# Patient Record
Sex: MALE | Race: OTHER | NOT HISPANIC OR LATINO | Employment: FULL TIME | URBAN - METROPOLITAN AREA
[De-identification: names, ages, dates, MRNs, and addresses within clinical notes are randomized per-mention and may not be internally consistent; named-entity substitution may affect disease eponyms.]

---

## 2023-11-05 ENCOUNTER — HOSPITAL ENCOUNTER (EMERGENCY)
Facility: HOSPITAL | Age: 38
Discharge: HOME | End: 2023-11-06
Attending: EMERGENCY MEDICINE
Payer: COMMERCIAL

## 2023-11-05 VITALS
TEMPERATURE: 99 F | HEART RATE: 103 BPM | DIASTOLIC BLOOD PRESSURE: 74 MMHG | SYSTOLIC BLOOD PRESSURE: 143 MMHG | OXYGEN SATURATION: 98 % | RESPIRATION RATE: 20 BRPM

## 2023-11-05 DIAGNOSIS — M25.562 LEFT KNEE PAIN: ICD-10-CM

## 2023-11-05 DIAGNOSIS — S86.812A RUPTURE OF LEFT PATELLAR TENDON, INITIAL ENCOUNTER: Primary | ICD-10-CM

## 2023-11-05 PROCEDURE — 99284 EMERGENCY DEPT VISIT MOD MDM: CPT | Mod: 25

## 2023-11-05 PROCEDURE — 29505 APPLICATION LONG LEG SPLINT: CPT | Mod: LT

## 2023-11-05 RX ORDER — OXYCODONE AND ACETAMINOPHEN 5; 325 MG/1; MG/1
1 TABLET ORAL EVERY 6 HOURS PRN
Qty: 12 TABLET | Refills: 0 | Status: ON HOLD | OUTPATIENT
Start: 2023-11-05 | End: 2023-11-09 | Stop reason: HOSPADM

## 2023-11-05 RX ORDER — HYDROCODONE BITARTRATE AND ACETAMINOPHEN 10; 325 MG/1; MG/1
1 TABLET ORAL
Status: COMPLETED | OUTPATIENT
Start: 2023-11-06 | End: 2023-11-05

## 2023-11-05 RX ADMIN — HYDROCODONE BITARTRATE AND ACETAMINOPHEN 1 TABLET: 10; 325 TABLET ORAL at 11:11

## 2023-11-05 NOTE — Clinical Note
"Tano Poole" Kris was seen and treated in our emergency department on 11/5/2023.  He may return to work on 11/09/2023.       If you have any questions or concerns, please don't hesitate to call.      Pierre Loco NP"

## 2023-11-06 ENCOUNTER — TELEPHONE (OUTPATIENT)
Dept: ORTHOPEDICS | Facility: CLINIC | Age: 38
End: 2023-11-06
Payer: COMMERCIAL

## 2023-11-06 PROCEDURE — 25000003 PHARM REV CODE 250: Performed by: NURSE PRACTITIONER

## 2023-11-06 NOTE — TELEPHONE ENCOUNTER
----- Message from Yasmin Dale sent at 11/6/2023  8:19 AM CST -----  Contact: Tano Carmona is calling in regards to getting an appt due to rupture patella. Pt was seen at ER on 11/5. Please call back at [phone] if no answer call [phone]                            Thanks  KT

## 2023-11-06 NOTE — ED PROVIDER NOTES
Encounter Date: 11/5/2023       History     Chief Complaint   Patient presents with    Knee Injury     Pt reports left knee dislocation and unable to bear weight, was playing soccer when it occurred     Patient is a 38-year-old male who presents with left knee pain.  Patient reports injuring his knee while playing soccer today.  States that he twisted 1 way and felt like his knee cap dislocated.  Patient is unable to bear weight to the left leg.  Patient is unable to extend his left leg.  No medications taken prior to arrival for relief of symptoms.  Patient shows no signs of distress at this time.      Review of patient's allergies indicates:  No Known Allergies  History reviewed. No pertinent past medical history.  No past surgical history on file.  No family history on file.     Review of Systems   Constitutional:  Negative for fever.   HENT:  Negative for sore throat.    Respiratory:  Negative for shortness of breath.    Cardiovascular:  Negative for chest pain.   Gastrointestinal:  Negative for nausea.   Genitourinary:  Negative for dysuria.   Musculoskeletal:  Positive for arthralgias (left knee) and joint swelling (left knee). Negative for back pain.   Skin:  Negative for rash.   Neurological:  Negative for weakness.   Hematological:  Does not bruise/bleed easily.       Physical Exam     Initial Vitals [11/05/23 2025]   BP Pulse Resp Temp SpO2   (!) 143/74 103 18 98.6 °F (37 °C) 98 %      MAP       --         Physical Exam    Nursing note and vitals reviewed.  Constitutional: He appears well-developed and well-nourished.   HENT:   Head: Normocephalic and atraumatic.   Eyes: Conjunctivae and EOM are normal. Pupils are equal, round, and reactive to light.   Neck: Neck supple.   Normal range of motion.  Cardiovascular:  Normal rate, regular rhythm, normal heart sounds and intact distal pulses.           Pulmonary/Chest: Breath sounds normal.   Abdominal: Abdomen is soft. Bowel sounds are normal.    Musculoskeletal:      Cervical back: Normal range of motion and neck supple.      Left knee: Swelling, deformity and bony tenderness present. Decreased range of motion (unable to extend). Tenderness present over the patellar tendon. Abnormal patellar mobility.     Neurological: He is alert and oriented to person, place, and time. He has normal strength and normal reflexes.   Skin: Skin is warm and dry. Capillary refill takes less than 2 seconds.   Psychiatric: He has a normal mood and affect. His behavior is normal. Judgment and thought content normal.         ED Course   Procedures  Labs Reviewed - No data to display       Imaging Results              MRI Knee Without Contrast Left (In process)                       X-Ray Knee Complete 4 or More Views Left (Final result)  Result time 11/05/23 21:17:59      Final result by Shahram Orta MD (11/05/23 21:17:59)                   Impression:      As above.    This report was flagged in Epic as abnormal.      Electronically signed by: Shahram Orta  Date:    11/05/2023  Time:    21:17               Narrative:    EXAMINATION:  XR KNEE COMP 4 OR MORE VIEWS LEFT    CLINICAL HISTORY:  Pain in left knee    TECHNIQUE:  AP, Lateral, Sunrise and Tunnel views of the left knee were preformed    COMPARISON:  None    FINDINGS:  No fracture.  No osseous destructive process.  Possible patella Judy.  Correlation for injury mechanism to exclude patellar tendon injury.                                       Medications   HYDROcodone-acetaminophen  mg per tablet 1 tablet (1 tablet Oral Given 11/5/23 1690)     Medical Decision Making  Patient informed of imaging results.  Patient instructed to stay nonweightbearing on the leg and use crutches as instructed.  Patient to follow-up with orthopedist further evaluation and management.  Patient verbalized understanding agrees to.    Amount and/or Complexity of Data Reviewed  Radiology: ordered.  Discussion of management or test  interpretation with external provider(s): Case discussed with Dr. Liao with ortho.  She recommends patient receive MRI in the ER to rule out tendon rupture.    11:50 PM  Dr. Liao veiwed MRI, states patient ruptured patellar tendon and will need surgery.  Pt able to be discharged home in knee immobilizer, crutches and non weight bearing.  Clinic will call patient for surgery this week.    Risk  Prescription drug management.                               Clinical Impression:   Final diagnoses:  [M25.562] Left knee pain  [S86.812A] Rupture of left patellar tendon, initial encounter (Primary)        ED Disposition Condition    Discharge Stable          ED Prescriptions       Medication Sig Dispense Start Date End Date Auth. Provider    oxyCODONE-acetaminophen (PERCOCET) 5-325 mg per tablet Take 1 tablet by mouth every 6 (six) hours as needed for Pain. 12 tablet 11/5/2023 -- Pierre Loco NP          Follow-up Information       Follow up With Specialties Details Why Contact Info    Clinic, [facility]  Call   [address] [place] [address] [address]  [place] [id number]  [phone]               Pierre Loco NP  11/05/23 7493

## 2023-11-07 ENCOUNTER — HOSPITAL ENCOUNTER (OUTPATIENT)
Dept: CARDIOLOGY | Facility: HOSPITAL | Age: 38
Discharge: HOME | End: 2023-11-07
Attending: ORTHOPAEDIC SURGERY
Payer: COMMERCIAL

## 2023-11-07 ENCOUNTER — OFFICE VISIT (OUTPATIENT)
Dept: ORTHOPEDICS | Facility: CLINIC | Age: 38
End: 2023-11-07
Payer: COMMERCIAL

## 2023-11-07 ENCOUNTER — TELEPHONE (OUTPATIENT)
Dept: PREADMISSION TESTING | Facility: HOSPITAL | Age: 38
End: 2023-11-07
Payer: COMMERCIAL

## 2023-11-07 ENCOUNTER — HOSPITAL ENCOUNTER (OUTPATIENT)
Dept: RADIOLOGY | Facility: HOSPITAL | Age: 38
Discharge: HOME | End: 2023-11-07
Attending: ORTHOPAEDIC SURGERY
Payer: COMMERCIAL

## 2023-11-07 VITALS
WEIGHT: 250 LBS | BODY MASS INDEX: 37.03 KG/M2 | HEART RATE: 87 BPM | HEIGHT: 69 IN | DIASTOLIC BLOOD PRESSURE: 68 MMHG | SYSTOLIC BLOOD PRESSURE: 127 MMHG

## 2023-11-07 DIAGNOSIS — M25.572 ACUTE LEFT ANKLE PAIN: ICD-10-CM

## 2023-11-07 DIAGNOSIS — S93.422A SPRAIN OF DELTOID LIGAMENT OF LEFT ANKLE, INITIAL ENCOUNTER: ICD-10-CM

## 2023-11-07 DIAGNOSIS — M25.572 ACUTE LEFT ANKLE PAIN: Primary | ICD-10-CM

## 2023-11-07 DIAGNOSIS — S86.812D RUPTURE OF LEFT PATELLAR TENDON, SUBSEQUENT ENCOUNTER: Primary | ICD-10-CM

## 2023-11-07 DIAGNOSIS — S86.812D RUPTURE OF LEFT PATELLAR TENDON, SUBSEQUENT ENCOUNTER: ICD-10-CM

## 2023-11-07 DIAGNOSIS — M19.072 ARTHRITIS OF LEFT ANKLE: ICD-10-CM

## 2023-11-07 PROBLEM — S86.812A RUPTURE OF LEFT PATELLAR TENDON: Status: ACTIVE | Noted: 2023-11-07

## 2023-11-07 PROCEDURE — 93005 ELECTROCARDIOGRAM TRACING: CPT

## 2023-11-07 PROCEDURE — 1159F MED LIST DOCD IN RCRD: CPT | Mod: CPTII,S$GLB,, | Performed by: ORTHOPAEDIC SURGERY

## 2023-11-07 PROCEDURE — 1160F RVW MEDS BY RX/DR IN RCRD: CPT | Mod: CPTII,S$GLB,, | Performed by: ORTHOPAEDIC SURGERY

## 2023-11-07 PROCEDURE — 3078F PR MOST RECENT DIASTOLIC BLOOD PRESSURE < 80 MM HG: ICD-10-PCS | Mod: CPTII,S$GLB,, | Performed by: ORTHOPAEDIC SURGERY

## 2023-11-07 PROCEDURE — 93010 EKG 12-LEAD: ICD-10-PCS | Mod: ,,, | Performed by: INTERNAL MEDICINE

## 2023-11-07 PROCEDURE — 73610 X-RAY EXAM OF ANKLE: CPT | Mod: 26,LT,, | Performed by: RADIOLOGY

## 2023-11-07 PROCEDURE — 99204 PR OFFICE/OUTPT VISIT, NEW, LEVL IV, 45-59 MIN: ICD-10-PCS | Mod: S$GLB,,, | Performed by: ORTHOPAEDIC SURGERY

## 2023-11-07 PROCEDURE — 3008F BODY MASS INDEX DOCD: CPT | Mod: CPTII,S$GLB,, | Performed by: ORTHOPAEDIC SURGERY

## 2023-11-07 PROCEDURE — 1159F PR MEDICATION LIST DOCUMENTED IN MEDICAL RECORD: ICD-10-PCS | Mod: CPTII,S$GLB,, | Performed by: ORTHOPAEDIC SURGERY

## 2023-11-07 PROCEDURE — 3078F DIAST BP <80 MM HG: CPT | Mod: CPTII,S$GLB,, | Performed by: ORTHOPAEDIC SURGERY

## 2023-11-07 PROCEDURE — 3074F SYST BP LT 130 MM HG: CPT | Mod: CPTII,S$GLB,, | Performed by: ORTHOPAEDIC SURGERY

## 2023-11-07 PROCEDURE — 1160F PR REVIEW ALL MEDS BY PRESCRIBER/CLIN PHARMACIST DOCUMENTED: ICD-10-PCS | Mod: CPTII,S$GLB,, | Performed by: ORTHOPAEDIC SURGERY

## 2023-11-07 PROCEDURE — 93010 ELECTROCARDIOGRAM REPORT: CPT | Mod: ,,, | Performed by: INTERNAL MEDICINE

## 2023-11-07 PROCEDURE — 99204 OFFICE O/P NEW MOD 45 MIN: CPT | Mod: S$GLB,,, | Performed by: ORTHOPAEDIC SURGERY

## 2023-11-07 PROCEDURE — 73610 XR ANKLE COMPLETE 3 VIEW LEFT: ICD-10-PCS | Mod: 26,LT,, | Performed by: RADIOLOGY

## 2023-11-07 PROCEDURE — 99999 PR PBB SHADOW E&M-EST. PATIENT-LVL V: ICD-10-PCS | Mod: PBBFAC,,, | Performed by: ORTHOPAEDIC SURGERY

## 2023-11-07 PROCEDURE — 3074F PR MOST RECENT SYSTOLIC BLOOD PRESSURE < 130 MM HG: ICD-10-PCS | Mod: CPTII,S$GLB,, | Performed by: ORTHOPAEDIC SURGERY

## 2023-11-07 PROCEDURE — 3008F PR BODY MASS INDEX (BMI) DOCUMENTED: ICD-10-PCS | Mod: CPTII,S$GLB,, | Performed by: ORTHOPAEDIC SURGERY

## 2023-11-07 PROCEDURE — 99999 PR PBB SHADOW E&M-EST. PATIENT-LVL V: CPT | Mod: PBBFAC,,, | Performed by: ORTHOPAEDIC SURGERY

## 2023-11-07 PROCEDURE — 73610 X-RAY EXAM OF ANKLE: CPT | Mod: TC,LT

## 2023-11-07 NOTE — H&P (VIEW-ONLY)
"Subjective:     Patient ID: Tano Ponce is a 38 y.o. male.    Chief Complaint: Pain and Injury of the Left Knee      HPI:  The patient is in with his wife due to a left d knee injury of November 5, 2023.  Playing soccer and running.  The left knee popped and he fell.  Was seen in the emergency room.  Patellar tendon rupture was diagnosed.  He is had x-rayse.  And an MRI.  Now for orthopedic follow-up.    Complains of swelling and pain at the left knee.  Unable to extend the knee.  Complains of left ankle pain And swelling.  Has a history of left knee swelling in the past.    Works at a chemical plant.    History reviewed. No pertinent past medical history.  History reviewed. No pertinent surgical history.  History reviewed. No pertinent family history.  Social History     Socioeconomic History    Marital status:    Tobacco Use    Smoking status: Former     Types: Cigarettes    Smokeless tobacco: Current     Medication List with Changes/Refills   Current Medications    OXYCODONE-ACETAMINOPHEN (PERCOCET) 5-325 MG PER TABLET    Take 1 tablet by mouth every 6 (six) hours as needed for Pain.     Review of patient's allergies indicates:  No Known Allergies  ROS     Objective:   Body mass index is 36.92 kg/m².  Vitals:    11/07/23 1011   BP: 127/68   Pulse: 87   Weight: 113.4 kg (250 lb)   Height: 5' 9" (1.753 m)   PainSc: 10-Worst pain ever   PainLoc: Knee       PHYSICAL EXAM:  Well-developed overweight male in no acutelo distress.  He comes in with crutches and a knee immobilizer partial weight-bearing on the left lower extremity.    Examination of the left knee reveals 3+ effusion.  There is tenderness at the patellar tendon.  Unable to actively extend the knee.  No calf swelling or tenderness.  Moves the ankle and foot normally.    Examination of the left ankle reveals a small joint effusionc.  There is tenderness around the medial malleolus.  Good range of motion.  No instability    X-rays of the left " knee from November 5, 2023 reveal high-riding patella    MRI of the left knee from November 5, 2023 reveals rupture of the patellar tendon from the patella.    X-rays of the left ankle done today reveal degenerative arthritis.  Joint space is maintained except at the medial clear space where this narrowed with the presence of osteophytes and calcification.    Rupture of left patellar tendon, subsequent encounter  -     Case Request Operating Room: REPAIR, TENDON, PATELLAR  -     CBC Auto Differential; Future; Expected date: 11/07/2023  -     Comprehensive Metabolic Panel; Future; Expected date: 11/07/2023  -     SCHEDULED EKG 12-LEAD (to Muse); Future    Arthritis of left ankle    Sprain of deltoid ligament of left ankle, initial encounter        Plan:  The patient has left patellar tendon Rupture.  Discussed this with him at length.  Plan to proceed with surgical repair on November 9. he has some arthritic changes of the left ankle with acute swelling likely related to spraining injury.  I discussed the nature of the surgical procedure to repair the patellar tendon, rehab and recovery and postop immobilization.  He understands and agrees that we proceed.  Pending CBC, CMP and EKG.    Patient Instructions   The left patellar tendon is ruptured and needs surgical repair.  This is scheduled for 7:00 a.m. on November 9.  You will be called the day prior with the time to be at the hospital for surgery.  Nothing to eat or drink after midnight on November 8.    The left ankle has arthritic changes and has been Sprained.  Expect this will gradually improve.    Postoperatively keep the knee brace on, keep bandage clean and dry, use crutches.  May put as much weight on the left leg as is comfortable.  Keep it elevated  As possible.  Return for postop appointment asVs will be scheduled               Geovani Carl MD, FAAOS [facility], Orthopedic Trauma Service  [place]

## 2023-11-07 NOTE — PATIENT INSTRUCTIONS
The left patellar tendon is ruptured and needs surgical repair.  This is scheduled for 7:00 a.m. on November 9.  You will be called the day prior with the time to be at the hospital for surgery.  Nothing to eat or drink after midnight on November 8.    The left ankle has arthritic changes and has been Sprained.  Expect this will gradually improve.    Postoperatively keep the knee brace on, keep bandage clean and dry, use crutches.  May put as much weight on the left leg as is comfortable.  Keep it elevated  As possible.  Return for postop appointment asVs will be scheduled

## 2023-11-07 NOTE — TELEPHONE ENCOUNTER
Pre op instructions reviewed with pt over telephone, verbalized understanding.    To confirm, Surgery is scheduled on 11/09/23. We will call you late afternoon the business day prior to surgery with your arrival time.    *Please report to the [facility] Lobby (1st Floor) located off of [facility] (2nd Entrance/Building on the left, in front of the flag pole).  Address: [address] [place] [address] [place]. [address]      INSTRUCTIONS IMPORTANT!!!  DO NOT Eat, Drink, or Smoke after 12 midnight unless instructed otherwise by your Surgeon. OK to brush teeth, no gum, candy or mints!    >>>MEDICATION INSTRUCTIONS<<<: Morning of Surgery, take small sip of water with ONLY these medications:  None    *Diabetic Patients: If you take diabetic or weight loss medication, Do NOT take morning of surgery unless instructed by Doctor. Metformin to be stopped 24 hrs prior to surgery. Ozempic/ Mounjaro/ Wegovy/ Trulicity/ Semaglutide injections or weight loss medication to be stopped 7 days prior to surgery. DO NOT take long-acting insulin the evening before surgery. Blood sugars will be checked in pre-op by Nurse.    *Patients should HOLD all vitamins, herbal supplements, weight loss medication, aspirin products & NSAIDS 7 days prior to surgery, as these can thin the blood. Ok to take Tylenol.    ____  Avoid Alcoholic beverages 3 days prior to surgery, as it can thin the blood.  ____  NO Acrylic/fake nails or nail polish worn day of surgery (specifically hand/arm & foot surgeries).  ____  NO powder, lotions, deodorants, oils or cream on body.  ____  Remove all jewelry & piercings & foreign objects before arrival & leave at home.  ____  Remove Dentures, Hearing Aids & Contact Lens prior to surgery.  ____  Bring photo ID and insurance information to hospital (Leave Valuables at Home).  ____  If going home the same day, arrange for a ride home. You will not be able to drive for 24 hrs if Anesthesia was used.   ____  Females (ages  11-60): may need to give a urine sample the morning of surgery; please see Pre op Nurse prior to using the restroom.  ____  Males: Stop ED medications (Viagra, Cialis) 24 hrs prior to surgery.  ____  Wear clean, loose fitting clothing to allow for dressings/ bandages.      Bathing Instructions:    -Shower with anti-bacterial Soap (Hibiclens or Dial) the night before surgery and the morning of.   -Do not use Hibiclens on your face or genitals.   -Apply clean clothes after shower.  -Do not shave your face or body 2 days prior to surgery unless instructed otherwise by your Surgeon.  -Do not shave pubic hair 7 days prior to surgery (gyn pt's).    [facility]/Ride Policy:  Only 2 adults allowed in pre op/recovery area during your procedure. You MUST HAVE A RIDE HOME from a responsible adult that you know and trust. Medical Transport, Uber or Lyft can ONLY be used if patient has a responsible adult to accompany them during ride home.       *Signs and symptoms of Infection Before or After Surgery:               !!!If you experience any fever, chills, nausea/ vomiting, foul odor/ excessive drainage from surgical site, flu-like symptoms, new wounds or cuts, PLEASE CALL THE SURGEON OFFICE at [phone] or SEND MESSAGE THROUGH [employer] PORTAL!!!       *If you are running late or have questions the morning of surgery, please call the [facility] Surgery Dept @ [phone].     *Billing questions:  [phone]       Thank you,  -[facility] Pre Admit Dept.  [phone] or [phone]  M-F 7:30 am-4:00 pm (Closed Major Holidays)

## 2023-11-07 NOTE — PROGRESS NOTES
"Subjective:     Patient ID: Tano Ponce is a 38 y.o. male.    Chief Complaint: Pain and Injury of the Left Knee      HPI:  The patient is in with his wife due to a left d knee injury of November 5, 2023.  Playing soccer and running.  The left knee popped and he fell.  Was seen in the emergency room.  Patellar tendon rupture was diagnosed.  He is had x-rayse.  And an MRI.  Now for orthopedic follow-up.    Complains of swelling and pain at the left knee.  Unable to extend the knee.  Complains of left ankle pain And swelling.  Has a history of left knee swelling in the past.    Works at a chemical plant.    History reviewed. No pertinent past medical history.  History reviewed. No pertinent surgical history.  History reviewed. No pertinent family history.  Social History     Socioeconomic History    Marital status:    Tobacco Use    Smoking status: Former     Types: Cigarettes    Smokeless tobacco: Current     Medication List with Changes/Refills   Current Medications    OXYCODONE-ACETAMINOPHEN (PERCOCET) 5-325 MG PER TABLET    Take 1 tablet by mouth every 6 (six) hours as needed for Pain.     Review of patient's allergies indicates:  No Known Allergies  ROS     Objective:   Body mass index is 36.92 kg/m².  Vitals:    11/07/23 1011   BP: 127/68   Pulse: 87   Weight: 113.4 kg (250 lb)   Height: 5' 9" (1.753 m)   PainSc: 10-Worst pain ever   PainLoc: Knee       PHYSICAL EXAM:  Well-developed overweight male in no acutelo distress.  He comes in with crutches and a knee immobilizer partial weight-bearing on the left lower extremity.    Examination of the left knee reveals 3+ effusion.  There is tenderness at the patellar tendon.  Unable to actively extend the knee.  No calf swelling or tenderness.  Moves the ankle and foot normally.    Examination of the left ankle reveals a small joint effusionc.  There is tenderness around the medial malleolus.  Good range of motion.  No instability    X-rays of the left " knee from November 5, 2023 reveal high-riding patella    MRI of the left knee from November 5, 2023 reveals rupture of the patellar tendon from the patella.    X-rays of the left ankle done today reveal degenerative arthritis.  Joint space is maintained except at the medial clear space where this narrowed with the presence of osteophytes and calcification.    Rupture of left patellar tendon, subsequent encounter  -     Case Request Operating Room: REPAIR, TENDON, PATELLAR  -     CBC Auto Differential; Future; Expected date: 11/07/2023  -     Comprehensive Metabolic Panel; Future; Expected date: 11/07/2023  -     SCHEDULED EKG 12-LEAD (to Muse); Future    Arthritis of left ankle    Sprain of deltoid ligament of left ankle, initial encounter        Plan:  The patient has left patellar tendon Rupture.  Discussed this with him at length.  Plan to proceed with surgical repair on November 9. he has some arthritic changes of the left ankle with acute swelling likely related to spraining injury.  I discussed the nature of the surgical procedure to repair the patellar tendon, rehab and recovery and postop immobilization.  He understands and agrees that we proceed.  Pending CBC, CMP and EKG.    Patient Instructions   The left patellar tendon is ruptured and needs surgical repair.  This is scheduled for 7:00 a.m. on November 9.  You will be called the day prior with the time to be at the hospital for surgery.  Nothing to eat or drink after midnight on November 8.    The left ankle has arthritic changes and has been Sprained.  Expect this will gradually improve.    Postoperatively keep the knee brace on, keep bandage clean and dry, use crutches.  May put as much weight on the left leg as is comfortable.  Keep it elevated  As possible.  Return for postop appointment asVs will be scheduled               Geovani Carl MD, FAAOS [facility], Orthopedic Trauma Service  [place]

## 2023-11-08 ENCOUNTER — TELEPHONE (OUTPATIENT)
Dept: PREADMISSION TESTING | Facility: HOSPITAL | Age: 38
End: 2023-11-08
Payer: COMMERCIAL

## 2023-11-08 ENCOUNTER — ANESTHESIA EVENT (OUTPATIENT)
Dept: SURGERY | Facility: HOSPITAL | Age: 38
End: 2023-11-08
Payer: COMMERCIAL

## 2023-11-08 NOTE — ANESTHESIA PREPROCEDURE EVALUATION
11/08/2023  Tano Ponce is a 38 y.o., male.    Patient Active Problem List   Diagnosis    Rupture of left patellar tendon    Arthritis of left ankle    Sprain of deltoid ligament of left ankle     Past Surgical History:   Procedure Laterality Date    COLONOSCOPY         Pre-op Assessment    I have reviewed the Patient Summary Reports.     I have reviewed the Nursing Notes. I have reviewed the NPO Status.   I have reviewed the Medications.     Review of Systems  Anesthesia Hx:  No problems with previous Anesthesia  Denies Family Hx of Anesthesia complications.   Denies Personal Hx of Anesthesia complications.   Social:  Alcohol Use, Former Smoker    Hematology/Oncology:  Hematology Normal        Cardiovascular:  Cardiovascular Normal  ECG has been reviewed.    Pulmonary:  Pulmonary Normal    Renal/:  Renal/ Normal     Hepatic/GI:   Liver Disease, (elevated LFT's)    Musculoskeletal:   Arthritis of left ankle  Rupture of left patellar tendon  Sprain of deltoid ligament of left ankle     Neurological:  Neurology Normal    Endocrine:  Endocrine Normal  Obesity / BMI > 30      Physical Exam  General: Alert and Oriented    Airway:  Mallampati: II   Mouth Opening: Normal  TM Distance: Normal  Tongue: Normal  Neck ROM: Normal ROM        Anesthesia Plan  Type of Anesthesia, risks & benefits discussed:    Anesthesia Type: Gen ETT  Intra-op Monitoring Plan: Standard ASA Monitors  Post Op Pain Control Plan: multimodal analgesia  Induction:  IV  Airway Plan: , Post-Induction  Informed Consent: Informed consent signed with the Patient and all parties understand the risks and agree with anesthesia plan.  All questions answered.   ASA Score: 2  Day of Surgery Review of History & Physical: I have interviewed and examined the patient. I have reviewed the patient's H&P dated:     Ready For Surgery From  Anesthesia Perspective.     .      Chemistry        Component Value Date/Time     11/07/2023 1230    K 4.2 11/07/2023 1230     11/07/2023 1230    CO2 25 11/07/2023 1230    BUN 13 11/07/2023 1230    CREATININE 1.1 11/07/2023 1230    GLU 84 11/07/2023 1230        Component Value Date/Time    CALCIUM 9.2 11/07/2023 1230    ALKPHOS 54 (L) 11/07/2023 1230    AST 58 (H) 11/07/2023 1230     (H) 11/07/2023 1230    BILITOT 1.4 (H) 11/07/2023 1230        Lab Results   Component Value Date    WBC 7.26 11/07/2023    HGB 14.0 11/07/2023    HCT 42.2 11/07/2023    MCV 86 11/07/2023     11/07/2023

## 2023-11-09 ENCOUNTER — ANESTHESIA (OUTPATIENT)
Dept: SURGERY | Facility: HOSPITAL | Age: 38
End: 2023-11-09
Payer: COMMERCIAL

## 2023-11-09 ENCOUNTER — HOSPITAL ENCOUNTER (OUTPATIENT)
Facility: HOSPITAL | Age: 38
Discharge: HOME | End: 2023-11-09
Attending: ORTHOPAEDIC SURGERY | Admitting: ORTHOPAEDIC SURGERY
Payer: COMMERCIAL

## 2023-11-09 DIAGNOSIS — S86.812D PATELLAR TENDON RUPTURE, LEFT, SUBSEQUENT ENCOUNTER: Primary | ICD-10-CM

## 2023-11-09 PROCEDURE — 63600175 PHARM REV CODE 636 W HCPCS: Performed by: ANESTHESIOLOGY

## 2023-11-09 PROCEDURE — 25000003 PHARM REV CODE 250: Performed by: ANESTHESIOLOGY

## 2023-11-09 PROCEDURE — 27380 REPAIR OF KNEECAP TENDON: CPT | Mod: LT,,, | Performed by: ORTHOPAEDIC SURGERY

## 2023-11-09 PROCEDURE — 63600175 PHARM REV CODE 636 W HCPCS: Performed by: NURSE ANESTHETIST, CERTIFIED REGISTERED

## 2023-11-09 PROCEDURE — 27380 PR FIX INFRAPATELLA TENDON,PRIMARY: ICD-10-PCS | Mod: LT,,, | Performed by: ORTHOPAEDIC SURGERY

## 2023-11-09 PROCEDURE — C1713 ANCHOR/SCREW BN/BN,TIS/BN: HCPCS | Performed by: ORTHOPAEDIC SURGERY

## 2023-11-09 PROCEDURE — 37000008 HC ANESTHESIA 1ST 15 MINUTES: Performed by: ORTHOPAEDIC SURGERY

## 2023-11-09 PROCEDURE — 71000039 HC RECOVERY, EACH ADD'L HOUR: Performed by: ORTHOPAEDIC SURGERY

## 2023-11-09 PROCEDURE — 37000009 HC ANESTHESIA EA ADD 15 MINS: Performed by: ORTHOPAEDIC SURGERY

## 2023-11-09 PROCEDURE — 71000015 HC POSTOP RECOV 1ST HR: Performed by: ORTHOPAEDIC SURGERY

## 2023-11-09 PROCEDURE — 36000711: Performed by: ORTHOPAEDIC SURGERY

## 2023-11-09 PROCEDURE — 27380 PR FIX INFRAPATELLA TENDON,PRIMARY: ICD-10-PCS | Mod: AS,LT,, | Performed by: PHYSICIAN ASSISTANT

## 2023-11-09 PROCEDURE — 25000003 PHARM REV CODE 250: Performed by: NURSE ANESTHETIST, CERTIFIED REGISTERED

## 2023-11-09 PROCEDURE — 27380 REPAIR OF KNEECAP TENDON: CPT | Mod: AS,LT,, | Performed by: PHYSICIAN ASSISTANT

## 2023-11-09 PROCEDURE — 36000710: Performed by: ORTHOPAEDIC SURGERY

## 2023-11-09 PROCEDURE — 71000033 HC RECOVERY, INTIAL HOUR: Performed by: ORTHOPAEDIC SURGERY

## 2023-11-09 DEVICE — 5.5MM BC CORKSCREW FT W/ SUTURETAPE
Type: IMPLANTABLE DEVICE | Site: KNEE | Status: FUNCTIONAL
Brand: ARTHREX®

## 2023-11-09 RX ORDER — DEXAMETHASONE SODIUM PHOSPHATE 4 MG/ML
INJECTION, SOLUTION INTRA-ARTICULAR; INTRALESIONAL; INTRAMUSCULAR; INTRAVENOUS; SOFT TISSUE
Status: DISCONTINUED | OUTPATIENT
Start: 2023-11-09 | End: 2023-11-09

## 2023-11-09 RX ORDER — KETOROLAC TROMETHAMINE 30 MG/ML
15 INJECTION, SOLUTION INTRAMUSCULAR; INTRAVENOUS EVERY 8 HOURS PRN
Status: DISCONTINUED | OUTPATIENT
Start: 2023-11-09 | End: 2023-11-09 | Stop reason: HOSPADM

## 2023-11-09 RX ORDER — ONDANSETRON 2 MG/ML
4 INJECTION INTRAMUSCULAR; INTRAVENOUS DAILY PRN
Status: DISCONTINUED | OUTPATIENT
Start: 2023-11-09 | End: 2023-11-09 | Stop reason: HOSPADM

## 2023-11-09 RX ORDER — ONDANSETRON 2 MG/ML
INJECTION INTRAMUSCULAR; INTRAVENOUS
Status: DISCONTINUED | OUTPATIENT
Start: 2023-11-09 | End: 2023-11-09

## 2023-11-09 RX ORDER — ALBUTEROL SULFATE 0.83 MG/ML
2.5 SOLUTION RESPIRATORY (INHALATION) EVERY 4 HOURS PRN
Status: DISCONTINUED | OUTPATIENT
Start: 2023-11-09 | End: 2023-11-09 | Stop reason: HOSPADM

## 2023-11-09 RX ORDER — FENTANYL CITRATE 50 UG/ML
INJECTION, SOLUTION INTRAMUSCULAR; INTRAVENOUS
Status: DISCONTINUED | OUTPATIENT
Start: 2023-11-09 | End: 2023-11-09

## 2023-11-09 RX ORDER — LIDOCAINE HYDROCHLORIDE 20 MG/ML
INJECTION INTRAVENOUS
Status: DISCONTINUED | OUTPATIENT
Start: 2023-11-09 | End: 2023-11-09

## 2023-11-09 RX ORDER — DIPHENHYDRAMINE HYDROCHLORIDE 50 MG/ML
25 INJECTION INTRAMUSCULAR; INTRAVENOUS EVERY 6 HOURS PRN
Status: DISCONTINUED | OUTPATIENT
Start: 2023-11-09 | End: 2023-11-09 | Stop reason: HOSPADM

## 2023-11-09 RX ORDER — SUCCINYLCHOLINE CHLORIDE 20 MG/ML
INJECTION INTRAMUSCULAR; INTRAVENOUS
Status: DISCONTINUED | OUTPATIENT
Start: 2023-11-09 | End: 2023-11-09

## 2023-11-09 RX ORDER — MIDAZOLAM HYDROCHLORIDE 1 MG/ML
INJECTION, SOLUTION INTRAMUSCULAR; INTRAVENOUS
Status: DISCONTINUED | OUTPATIENT
Start: 2023-11-09 | End: 2023-11-09

## 2023-11-09 RX ORDER — PROPOFOL 10 MG/ML
VIAL (ML) INTRAVENOUS
Status: DISCONTINUED | OUTPATIENT
Start: 2023-11-09 | End: 2023-11-09

## 2023-11-09 RX ORDER — ROCURONIUM BROMIDE 10 MG/ML
INJECTION, SOLUTION INTRAVENOUS
Status: DISCONTINUED | OUTPATIENT
Start: 2023-11-09 | End: 2023-11-09

## 2023-11-09 RX ORDER — MEPERIDINE HYDROCHLORIDE 25 MG/ML
12.5 INJECTION INTRAMUSCULAR; INTRAVENOUS; SUBCUTANEOUS ONCE
Status: DISCONTINUED | OUTPATIENT
Start: 2023-11-09 | End: 2023-11-09 | Stop reason: HOSPADM

## 2023-11-09 RX ORDER — CEFAZOLIN SODIUM 1 G/3ML
INJECTION, POWDER, FOR SOLUTION INTRAMUSCULAR; INTRAVENOUS
Status: DISCONTINUED | OUTPATIENT
Start: 2023-11-09 | End: 2023-11-09

## 2023-11-09 RX ORDER — PROCHLORPERAZINE EDISYLATE 5 MG/ML
5 INJECTION INTRAMUSCULAR; INTRAVENOUS EVERY 30 MIN PRN
Status: DISCONTINUED | OUTPATIENT
Start: 2023-11-09 | End: 2023-11-09 | Stop reason: HOSPADM

## 2023-11-09 RX ORDER — OXYCODONE HYDROCHLORIDE 5 MG/1
5 TABLET ORAL
Status: DISCONTINUED | OUTPATIENT
Start: 2023-11-09 | End: 2023-11-09 | Stop reason: HOSPADM

## 2023-11-09 RX ORDER — HYDROMORPHONE HYDROCHLORIDE 2 MG/ML
0.2 INJECTION, SOLUTION INTRAMUSCULAR; INTRAVENOUS; SUBCUTANEOUS EVERY 5 MIN PRN
Status: DISCONTINUED | OUTPATIENT
Start: 2023-11-09 | End: 2023-11-09 | Stop reason: HOSPADM

## 2023-11-09 RX ORDER — OXYCODONE AND ACETAMINOPHEN 5; 325 MG/1; MG/1
1 TABLET ORAL EVERY 6 HOURS PRN
Qty: 28 TABLET | Refills: 0 | Status: SHIPPED | OUTPATIENT
Start: 2023-11-09 | End: 2023-11-16

## 2023-11-09 RX ORDER — SODIUM CHLORIDE 0.9 % (FLUSH) 0.9 %
3 SYRINGE (ML) INJECTION
Status: DISCONTINUED | OUTPATIENT
Start: 2023-11-09 | End: 2023-11-09 | Stop reason: HOSPADM

## 2023-11-09 RX ADMIN — PROPOFOL 200 MG: 10 INJECTION, EMULSION INTRAVENOUS at 07:11

## 2023-11-09 RX ADMIN — LIDOCAINE HYDROCHLORIDE 50 MG: 20 INJECTION INTRAVENOUS at 07:11

## 2023-11-09 RX ADMIN — FENTANYL CITRATE 50 MCG: 50 INJECTION, SOLUTION INTRAMUSCULAR; INTRAVENOUS at 07:11

## 2023-11-09 RX ADMIN — OXYCODONE HYDROCHLORIDE 5 MG: 5 TABLET ORAL at 10:11

## 2023-11-09 RX ADMIN — HYDROMORPHONE HYDROCHLORIDE 0.2 MG: 2 INJECTION INTRAMUSCULAR; INTRAVENOUS; SUBCUTANEOUS at 09:11

## 2023-11-09 RX ADMIN — FENTANYL CITRATE 50 MCG: 50 INJECTION, SOLUTION INTRAMUSCULAR; INTRAVENOUS at 08:11

## 2023-11-09 RX ADMIN — HYDROMORPHONE HYDROCHLORIDE 0.2 MG: 2 INJECTION INTRAMUSCULAR; INTRAVENOUS; SUBCUTANEOUS at 10:11

## 2023-11-09 RX ADMIN — SUCCINYLCHOLINE CHLORIDE 120 MG: 20 INJECTION, SOLUTION INTRAMUSCULAR; INTRAVENOUS; PARENTERAL at 07:11

## 2023-11-09 RX ADMIN — SODIUM CHLORIDE, SODIUM LACTATE, POTASSIUM CHLORIDE, AND CALCIUM CHLORIDE: .6; .31; .03; .02 INJECTION, SOLUTION INTRAVENOUS at 07:11

## 2023-11-09 RX ADMIN — CEFAZOLIN 2 G: 330 INJECTION, POWDER, FOR SOLUTION INTRAMUSCULAR; INTRAVENOUS at 07:11

## 2023-11-09 RX ADMIN — DEXAMETHASONE SODIUM PHOSPHATE 4 MG: 4 INJECTION, SOLUTION INTRA-ARTICULAR; INTRALESIONAL; INTRAMUSCULAR; INTRAVENOUS; SOFT TISSUE at 07:11

## 2023-11-09 RX ADMIN — ONDANSETRON 4 MG: 2 INJECTION INTRAMUSCULAR; INTRAVENOUS at 07:11

## 2023-11-09 RX ADMIN — KETOROLAC TROMETHAMINE 15 MG: 30 INJECTION, SOLUTION INTRAMUSCULAR; INTRAVENOUS at 09:11

## 2023-11-09 RX ADMIN — MIDAZOLAM 2 MG: 1 INJECTION INTRAMUSCULAR; INTRAVENOUS at 07:11

## 2023-11-09 RX ADMIN — ROCURONIUM BROMIDE 5 MG: 10 INJECTION, SOLUTION INTRAVENOUS at 07:11

## 2023-11-09 NOTE — ANESTHESIA POSTPROCEDURE EVALUATION
Anesthesia Post Evaluation    Patient: Tano Ponce    Procedure(s) Performed: Procedure(s) (LRB):  REPAIR, TENDON, PATELLAR (Left)    Final Anesthesia Type: general      Patient location during evaluation: PACU  Patient participation: Yes- Able to Participate  Level of consciousness: awake and alert  Post-procedure vital signs: reviewed and stable  Pain management: adequate  Airway patency: patent  ANNA mitigation strategies: Verification of full reversal of neuromuscular block  PONV status at discharge: No PONV  Anesthetic complications: no      Cardiovascular status: hemodynamically stable  Respiratory status: spontaneous ventilation  Hydration status: euvolemic  Follow-up not needed.          Vitals Value Taken Time   /70 11/09/23 1021   Temp 36.4 °C (97.5 °F) 11/09/23 0915   Pulse 74 11/09/23 1025   Resp 19 11/09/23 1025   SpO2 94 % 11/09/23 1025   Vitals shown include unvalidated device data.      No case tracking events are documented in the log.      Pain/Roman Score: Pain Rating Prior to Med Admin: 8 (11/9/2023  9:50 AM)  Roman Score: 7 (11/9/2023  9:30 AM)

## 2023-11-09 NOTE — DISCHARGE SUMMARY
[facility] (Hospital)  Discharge Note  Short Stay    Procedure(s) (LRB):  REPAIR, TENDON, PATELLAR (Left)      OUTCOME: Patient tolerated treatment/procedure well without complication and is now ready for discharge.    DISPOSITION: Home or Self Care    FINAL DIAGNOSIS:  <principal problem not specified>    FOLLOWUP: In clinic    DISCHARGE INSTRUCTIONS:  No discharge procedures on file.     TIME SPENT ON DISCHARGE: 10 minutes

## 2023-11-09 NOTE — OP NOTE
Certification of Assistant at Surgery       Surgery Date: 11/9/2023     Participating Surgeons:  Surgeon(s) and Role:     * Geovani Carl MD - Primary    Procedures:  Procedure(s) (LRB):  REPAIR, TENDON, PATELLAR (Left)    Assistant Surgeon's Certification of Necessity:  I understand that section 1842 (b) (6) (d) of the Social Security Act generally prohibits Medicare Part B reasonable charge payment for the services of assistants at surgery in teaching hospitals when qualified residents are available to furnish such services. I certify that the services for which payment is claimed were medically necessary, and that no qualified resident was available to perform the services. I further understand that these services are subject to post-payment review by the Medicare carrier.      Issac Uribe PA-C  *  11/09/2023  9:00 AM

## 2023-11-09 NOTE — OP NOTE
[facility] - Surgery ([facility])  Orthopaedic Surgery  Operative Note    SUMMARY     Date of Procedure: 11/09/2023     Procedure:   Procedure(s) (LRB):  REPAIR, TENDON, PATELLAR (Left)       Surgeon(s) and Role:     * Geovani Carl MD - Primary    Assisting Surgeon: Issac Uribe PA-C    Pre-Operative Diagnosis:   Rupture of left patellar tendon, subsequent encounter [S86.812D]    Post-Operative Diagnosis:   Post-Op Diagnosis Codes:     * Rupture of left patellar tendon, subsequent encounter [S86.812D]    Anesthesia: General    Operative Findings (including complications, if any):  Rupture of the left patellar tendon within its substance, junction of mid and distal 1 thirds.  No complications    Description of Technical Procedures:  The patient was brought to the operating room and after administration of adequate general anesthetic the left lower extremity was prepped and draped in usual fashion for knee exposure.  The limb was exsanguinated with an Ace wrap and tourniquet inflated to 300 mmHg about the upper left thigh.  An 8 cm incision was made beginning at the tibial tubercle and carried proximally to just about the inferior pole of the patella.  Brought down through skin subcutaneous tissue.  The patellar tendon was ruptured through its substance at the junction of the mid and distal 1 thirds.  Through this the joint was exposed.  Patellar surface and femoral condyles were intact.  Hemarthrosis was evacuated the joint irrigated with saline solution.  Inspection of the tendon revealed marked fraying of the tendon margins.  Since the rupture was closer to the tibia decision was made to place suture anchors in the tibia and we have the suture tape through the tendon substance for the repair.  5.5 mm corkscrew anchor and 4.75 mm SwiveLock anchor were placed at the medial and lateral tibia below the level of the joint.  The 8 associated suture limbs were then passed through the distal stump of the tendon.  This was done  with a weaving type suture.  This was done sequentially.  The the 4 suture limbs were then passed through the proximal stump in a weaving fashion and the wound again irrigated with saline solution.  The sutures were then tied sequentially reapproximating the tendon margins.  Frayed and nonviable tendon tissue was sharply debrided.  The knee could be flexed to 90° without undue tension across the repair.  The medial and lateral retinacular tissues were then repaired with 1. Ethibond suture.  Subcutaneous tissues were closed with 2-0 Vicryl and skin with staples.  Sterile dressing was applied with Aquacel and Ace wrap.  The tourniquet was released and the patient was placed back into the knee immobilizer.  He was then awakened and brought to recovery room in stable condition    Significant Surgical Tasks Conducted by the Assistant(s), if Applicable:  Surgical for manipulation of the knee throughout the case, soft tissue retraction and suture management    Estimated Blood Loss (EBL):  20 cc           Implants:   Implant Name Type Inv. Item Serial No.  Lot No. LRB No. Used Action   SYS SWIVELOCK ANCH 4.75X19.1MM - [id number]  SYS SWIVELOCK ANCH 4.75X19.1MM  [employer] [id number] Left 1 Implanted and Explanted   ANCHOR MANN BC CORKSCREW 5.5MM - [id number]  ANCHOR MANN BC CORKSCREW 5.5MM  [employer] [id number] Left 2 Implanted       Specimens:  None           Condition: Good    Disposition: PACU - hemodynamically stable.    Attestation: I performed the procedure.    Post Plan:   Knee immobilizer, crutch ambulation, weight bear as tolerated  Returned to the [facility] in 2 weeks for staple removals  At 1st office follow-up knee immobilizer can be changed to a postop range of motion brace.  Due to the midsubstance rupture the patient's knee should be kept in extension for 6 weeks.  At the 6 week akhil knee flexion can be gradually increased at his tolerance and therapy begun.

## 2023-11-09 NOTE — ANESTHESIA PROCEDURE NOTES
Intubation    Date/Time: 11/9/2023 7:13 AM    Performed by: Jae Robles CRNA  Authorized by: Isaías Asher II, MD    Intubation:     Induction:  Intravenous    Intubated:  Postinduction    Mask Ventilation:  Not attempted    Attempts:  2    Attempted By:  CRNA    Method of Intubation:  Direct    Blade:  [place] 2    Laryngeal View Grade: Grade III - only epiglottis visible      Attempted By (2nd Attempt):  CRNA    Method of Intubation (2nd Attempt):  Video laryngoscopy    Blade (2nd Attempt):  [place] 3    Laryngeal View Grade (2nd Attempt): Grade I - full view of cords      Difficult Airway Encountered?: No      Complications:  None    Airway Device:  Oral endotracheal tube    Airway Device Size:  7.5    Style/Cuff Inflation:  Cuffed (inflated to minimal occlusive pressure)    Tube secured:  22    Secured at:  The lips    Placement Verified By:  Capnometry    Complicating Factors:  Obesity, oropharyngeal edema or fat and poor neck/head extension    Findings Post-Intubation:  BS equal bilateral

## 2023-11-09 NOTE — TRANSFER OF CARE
"Anesthesia Transfer of Care Note    Patient: Tano Ponce    Procedure(s) Performed: Procedure(s) (LRB):  REPAIR, TENDON, PATELLAR (Left)    Patient location: PACU    Anesthesia Type: general    Transport from OR: Transported from OR on room air with adequate spontaneous ventilation    Post pain: adequate analgesia    Post assessment: no apparent anesthetic complications    Post vital signs: stable    Level of consciousness: responds to stimulation    Nausea/Vomiting: no nausea/vomiting    Complications: none    Transfer of care protocol was followed      Last vitals:   Visit Vitals  /72   Pulse 67   Temp 36.7 °C (98.1 °F) (Skin)   Resp 18   Ht 5' 9" (1.753 m)   Wt 115.8 kg (255 lb 2.9 oz)   SpO2 96%   BMI 37.68 kg/m²     "

## 2023-11-10 ENCOUNTER — TELEPHONE (OUTPATIENT)
Dept: ORTHOPEDICS | Facility: CLINIC | Age: 38
End: 2023-11-10
Payer: COMMERCIAL

## 2023-11-10 NOTE — TELEPHONE ENCOUNTER
Spoke with patient and informed him that his knee stabilizer should remain on at all times and that he can take Motrin, Advil or Aleve between his doses of Percocet for pain relief. Patient verbalized understanding.

## 2023-11-10 NOTE — TELEPHONE ENCOUNTER
----- Message from Lisa Dinero sent at 11/10/2023 10:01 AM CST -----  Contact: Tano Freedman would like a call back at [phone] in regards to needing to speak with nurse about the current pain medication he's on, patient feel sits not working and would also like to know if he is supposed to be taking the stabilizer off, he hasn't taken off to lay down and wear it when walking around.  Thanks   Am

## 2023-11-13 VITALS
TEMPERATURE: 97 F | RESPIRATION RATE: 12 BRPM | DIASTOLIC BLOOD PRESSURE: 72 MMHG | SYSTOLIC BLOOD PRESSURE: 139 MMHG | HEIGHT: 69 IN | WEIGHT: 255.19 LBS | BODY MASS INDEX: 37.8 KG/M2 | OXYGEN SATURATION: 95 % | HEART RATE: 76 BPM

## 2023-11-24 ENCOUNTER — OFFICE VISIT (OUTPATIENT)
Dept: ORTHOPEDICS | Facility: CLINIC | Age: 38
End: 2023-11-24
Payer: COMMERCIAL

## 2023-11-24 VITALS
HEART RATE: 81 BPM | WEIGHT: 255 LBS | HEIGHT: 69 IN | BODY MASS INDEX: 37.77 KG/M2 | DIASTOLIC BLOOD PRESSURE: 77 MMHG | SYSTOLIC BLOOD PRESSURE: 123 MMHG

## 2023-11-24 DIAGNOSIS — S86.812D RUPTURE OF LEFT PATELLAR TENDON, SUBSEQUENT ENCOUNTER: Primary | ICD-10-CM

## 2023-11-24 PROCEDURE — 99024 POSTOP FOLLOW-UP VISIT: CPT | Mod: S$GLB,,, | Performed by: PHYSICIAN ASSISTANT

## 2023-11-24 PROCEDURE — 99999 PR PBB SHADOW E&M-EST. PATIENT-LVL III: ICD-10-PCS | Mod: PBBFAC,,, | Performed by: PHYSICIAN ASSISTANT

## 2023-11-24 PROCEDURE — 3078F DIAST BP <80 MM HG: CPT | Mod: CPTII,S$GLB,, | Performed by: PHYSICIAN ASSISTANT

## 2023-11-24 PROCEDURE — 1159F MED LIST DOCD IN RCRD: CPT | Mod: CPTII,S$GLB,, | Performed by: PHYSICIAN ASSISTANT

## 2023-11-24 PROCEDURE — 1159F PR MEDICATION LIST DOCUMENTED IN MEDICAL RECORD: ICD-10-PCS | Mod: CPTII,S$GLB,, | Performed by: PHYSICIAN ASSISTANT

## 2023-11-24 PROCEDURE — 3078F PR MOST RECENT DIASTOLIC BLOOD PRESSURE < 80 MM HG: ICD-10-PCS | Mod: CPTII,S$GLB,, | Performed by: PHYSICIAN ASSISTANT

## 2023-11-24 PROCEDURE — 3074F SYST BP LT 130 MM HG: CPT | Mod: CPTII,S$GLB,, | Performed by: PHYSICIAN ASSISTANT

## 2023-11-24 PROCEDURE — 99999 PR PBB SHADOW E&M-EST. PATIENT-LVL III: CPT | Mod: PBBFAC,,, | Performed by: PHYSICIAN ASSISTANT

## 2023-11-24 PROCEDURE — 3074F PR MOST RECENT SYSTOLIC BLOOD PRESSURE < 130 MM HG: ICD-10-PCS | Mod: CPTII,S$GLB,, | Performed by: PHYSICIAN ASSISTANT

## 2023-11-24 PROCEDURE — 1160F PR REVIEW ALL MEDS BY PRESCRIBER/CLIN PHARMACIST DOCUMENTED: ICD-10-PCS | Mod: CPTII,S$GLB,, | Performed by: PHYSICIAN ASSISTANT

## 2023-11-24 PROCEDURE — 1160F RVW MEDS BY RX/DR IN RCRD: CPT | Mod: CPTII,S$GLB,, | Performed by: PHYSICIAN ASSISTANT

## 2023-11-24 PROCEDURE — 99024 PR POST-OP FOLLOW-UP VISIT: ICD-10-PCS | Mod: S$GLB,,, | Performed by: PHYSICIAN ASSISTANT

## 2023-11-24 RX ORDER — OXYCODONE AND ACETAMINOPHEN 5; 325 MG/1; MG/1
1 TABLET ORAL EVERY 4 HOURS PRN
COMMUNITY
End: 2023-12-27

## 2023-11-24 NOTE — PROGRESS NOTES
"Subjective:      Patient ID: Tano Ponce is a 38 y.o. male.    Chief Complaint: Post-op Evaluation and Pain of the Left Knee      HPI: Tano Ponce is a 38-year-old male in clinic today for postoperative follow-up.  Patient is 15 days status post repair of left patellar tendon rupture.  Patient arrives to clinic without the knee immobilizer.  When I arrived in the room the patient had the knee bent at about a 30 degree angle.  He has no complaints at this time.  He denies numbness or tingling in the extremity.    History reviewed. No pertinent past medical history.    Current Outpatient Medications:     oxyCODONE-acetaminophen (PERCOCET) 5-325 mg per tablet, Take 1 tablet by mouth every 4 (four) hours as needed for Pain., Disp: , Rfl:   Review of patient's allergies indicates:  No Known Allergies    /77   Pulse 81   Ht 5' 9" (1.753 m)   Wt 115.7 kg (255 lb)   BMI 37.66 kg/m²     ROS      Objective:    Ortho Exam   Left knee:  Staples intact, incision well healed, no signs of infection   No effusion   Mild edema diffusely   ROM not tested  Calf and compartments are soft and compressible  Motor exam normal   Sensation and pulses intact  Cap refill brisk    GEN: Well developed, well nourished male. AAOX3. No acute distress.   Head: Normocephalic, atraumatic.   Eyes: ELDA  Neck: Trachea is midline, no adenopathy  Resp: Breathing unlabored.  Neuro: Motor function normal, Cranial nerves intact  Psych: Mood and affect appropriate.       Assessment:     Imaging:  No new imaging ordered today.        1. Rupture of left patellar tendon, subsequent encounter          Plan:       Staples removed, patient instructed on normal wound care with soap and water.  Patient was placed into a T ROM knee brace locked at 0°.  Patient was instructed to wear this brace at all times except for bathing/showering.  He understands that he is not to flex the knee at all as this could damage to the repair and lead to failure " of the surgery.  Patient voiced understanding.  We will see him back in clinic in 1 month for further evaluation.       Follow up in about 1 month (around 12/24/2023).          Patient note was created using MModal Dictation.  Any errors in syntax or even information may not have been identified and edited on initial review prior to signing this note.

## 2023-12-07 ENCOUNTER — PATIENT MESSAGE (OUTPATIENT)
Dept: ORTHOPEDICS | Facility: CLINIC | Age: 38
End: 2023-12-07
Payer: COMMERCIAL

## 2023-12-27 ENCOUNTER — OFFICE VISIT (OUTPATIENT)
Dept: ORTHOPEDICS | Facility: CLINIC | Age: 38
End: 2023-12-27
Payer: COMMERCIAL

## 2023-12-27 VITALS
WEIGHT: 255.06 LBS | DIASTOLIC BLOOD PRESSURE: 80 MMHG | HEART RATE: 69 BPM | HEIGHT: 69 IN | BODY MASS INDEX: 37.78 KG/M2 | TEMPERATURE: 99 F | SYSTOLIC BLOOD PRESSURE: 127 MMHG

## 2023-12-27 DIAGNOSIS — Z47.89 ORTHOPEDIC AFTERCARE: Primary | ICD-10-CM

## 2023-12-27 DIAGNOSIS — S86.812D RUPTURE OF LEFT PATELLAR TENDON, SUBSEQUENT ENCOUNTER: ICD-10-CM

## 2023-12-27 PROCEDURE — 3079F DIAST BP 80-89 MM HG: CPT | Mod: CPTII,S$GLB,, | Performed by: ORTHOPAEDIC SURGERY

## 2023-12-27 PROCEDURE — 1159F MED LIST DOCD IN RCRD: CPT | Mod: CPTII,S$GLB,, | Performed by: ORTHOPAEDIC SURGERY

## 2023-12-27 PROCEDURE — 1160F RVW MEDS BY RX/DR IN RCRD: CPT | Mod: CPTII,S$GLB,, | Performed by: ORTHOPAEDIC SURGERY

## 2023-12-27 PROCEDURE — 3074F SYST BP LT 130 MM HG: CPT | Mod: CPTII,S$GLB,, | Performed by: ORTHOPAEDIC SURGERY

## 2023-12-27 PROCEDURE — 99999 PR PBB SHADOW E&M-EST. PATIENT-LVL IV: CPT | Mod: PBBFAC,,, | Performed by: ORTHOPAEDIC SURGERY

## 2023-12-27 PROCEDURE — 99024 POSTOP FOLLOW-UP VISIT: CPT | Mod: S$GLB,,, | Performed by: ORTHOPAEDIC SURGERY

## 2023-12-27 RX ORDER — IBUPROFEN 200 MG
200 TABLET ORAL EVERY 6 HOURS PRN
COMMUNITY

## 2023-12-27 NOTE — PATIENT INSTRUCTIONS
Continue wearing the brace at all times except to shower.      When you are walking now that the brace has some motion, if the knee/thigh start to get tired, lock the brace in full extension again to rest it.    Use the crutches again now that you are bending your knee to walk.    The flexion is open to 30 degrees today 12/27/2023.    PT will open it a little more every week as you get stronger.         While waiting for PT to start, do the quad tightening exercise/pushing the leg down into the bed.    You may also do straight leg raises WITH THE BRACE LOCKED!  Until otherwise instructed by PT/

## 2023-12-27 NOTE — PROGRESS NOTES
Date of Surgery     (11/09/2023)  Surgery                  LEFT Patellar Tendon Repair, Dr. Carl                                Chief Complaint: Post-op Evaluation and Pain of the Left Knee      HPI: Tano Ponce is a 38 y.o.  male who is here for follow-up of his  surgery.  He is accompanied by his wife.    He is having minimal pain.  He is increasing his weight bearing.  He is able to do a straight-leg raise in brace.    History reviewed. No pertinent past medical history.    Past Surgical History:   Procedure Laterality Date    COLONOSCOPY      PATELLAR TENDON REPAIR Left 11/9/2023    Procedure: REPAIR, TENDON, PATELLAR;  Surgeon: Geovani Carl MD;  Location: [facility];  Service: Orthopedics;  Laterality: Left;     History reviewed. No pertinent family history.  Social History     Socioeconomic History    Marital status:    Tobacco Use    Smoking status: Former     Types: Cigarettes, Vaping with nicotine    Smokeless tobacco: Current   Substance and Sexual Activity    Alcohol use: Yes     Comment: occ     Medication List with Changes/Refills   Current Medications    IBUPROFEN (ADVIL,MOTRIN) 200 MG TABLET    Take 200 mg by mouth every 6 (six) hours as needed for Pain.   Discontinued Medications    OXYCODONE-ACETAMINOPHEN (PERCOCET) 5-325 MG PER TABLET    Take 1 tablet by mouth every 4 (four) hours as needed for Pain.     Review of patient's allergies indicates:  No Known Allergies    Physical Exam:     Wt Readings from Last 1 Encounters:   12/27/23 115.7 kg (255 lb 1.2 oz)     Temp Readings from Last 1 Encounters:   12/27/23 98.8 °F (37.1 °C) (Oral)     BP Readings from Last 1 Encounters:   12/27/23 127/80     Pulse Readings from Last 1 Encounters:   12/27/23 69       Body mass index is 37.67 kg/m².    General Appearance:   NAD, well appearing, cooperative    Neurologic:  Alert and oriented x3    Pysch:  Age appropriate    GAIT:  Mildly antalgic gait in brace without assistive  device    Musculoskeletal:     Left knee:  Incision nicely.  No erythema.  Swelling trace.  No ecchymosis.  AROM 10-40.  Can be passively extended to 0.  Calf soft/NTTP.  Distal neurovascular status intact.       Assessment:       Encounter Diagnoses   Name Primary?    Orthopedic aftercare Yes    Rupture of left patellar tendon, subsequent encounter               DISCUSSION:   Patient's symptoms, imaging, diagnosis and prognosis reviewed and discussed.  Discussed  healing progression.   Discussed weight bearing status and the progression Discussed the need for compliance with treatment.     Patient given an opportunity to ask questions.       Plan:       Tano was seen today for post-op evaluation and pain.    Diagnoses and all orders for this visit:    Orthopedic aftercare    Rupture of left patellar tendon, subsequent encounter         Weight bearing:    Right   knee As Tolerated in brace  Use the crutches until used to walking with knee flexiona  Brace was opened today to 30 degrees of flexion.  May increase 20 degrees per week as tolerated. Needs to continue wearing brace all the time when not working with PT   Work on the quad tightening exercises, and IN THE BRACE straight leg raises  Follow-up in 4 weeks       The patient understands, chooses and consents to this plan and accepts all   the risks which include but are not limited to the risks mentioned above.             Najma Liao, DO, CAQSM, [facility]  Orthopaedic Surgeon

## 2024-01-03 ENCOUNTER — CLINICAL SUPPORT (OUTPATIENT)
Dept: REHABILITATION | Facility: HOSPITAL | Age: 39
End: 2024-01-03
Attending: ORTHOPAEDIC SURGERY
Payer: COMMERCIAL

## 2024-01-03 DIAGNOSIS — S86.812D RUPTURE OF LEFT PATELLAR TENDON, SUBSEQUENT ENCOUNTER: ICD-10-CM

## 2024-01-03 DIAGNOSIS — R29.898 WEAKNESS OF LEFT LOWER EXTREMITY: ICD-10-CM

## 2024-01-03 DIAGNOSIS — M25.662 DECREASED RANGE OF MOTION OF LEFT KNEE: Primary | ICD-10-CM

## 2024-01-03 PROCEDURE — 97161 PT EVAL LOW COMPLEX 20 MIN: CPT | Mod: PN

## 2024-01-03 PROCEDURE — 97112 NEUROMUSCULAR REEDUCATION: CPT | Mod: PN

## 2024-01-04 NOTE — PLAN OF CARE
[facility]   Physical Therapy Treatment Note      Date: 1/3/2024     Name: Tano Ponce  Clinic Number: [id number]  Therapy Diagnosis:   Encounter Diagnoses   Name Primary?    Rupture of left patellar tendon, subsequent encounter     Decreased range of motion of left knee Yes    Weakness of left lower extremity       Physician: Najma Liao DO      Physician Orders: PT Eval and Treat  Medical Diagnosis from Referral: Rupture of left patellar tendon   Evaluation Date: 1/3/2024  Authorization Period Expiration: 12/27/2025  Plan of Care Expiration: 03/03/2024    Progress Update: 02/02/2024   Visit # / Visits authorized: 1 / 1   FOTO:  Knee  Number Date Score    1 01/03/2024 66%   2     3     4        PRECAUTIONS: Standard Precautions and Safety/fall precautions       Time In: 0640  Time Out: 0740  Total Appointment Time (timed & untimed codes): 60 minutes    12/27/2023  MD Referral note  Dx:  Left midsubstance patellar tendon tear, s/p repair 11/9/2023 by Dr. Carl  WBAT in brace  Brace was opened today to 30 degrees of flexion.  May increase 20 degrees per week as tolerated.   Needs to continue wearing brace all the time when not working with PT.      SUBJECTIVE     Date of onset: 11/09/2023  Chief Complaint: left knee pain    History of current condition - Tano is a 38 y.o. male who presents to physical therapy reporting injury the left leg playing soccer.    Falls: [x] No  [] Yes:   Imaging: [x] Xray [x] MRI [] CT: Reviewed    Prior Therapy:  [x] No  [] Yes:   Social History: Pt lives with their spouse [x] House [] Apartment/Condo []other  Stairs: [x] No  [] Yes:   Occupation:  at chemical plant and cut grass  Prior Level of Function: Independent with all activities of daily living  Current Level of Function: Ambulatory with axillary crutches and knee immobilizer set 0-30 degrees    Pain:  Current 0/10, worst 2/10, best 0 /10   Location: [] Right [x] Left []  Bilateral: knee  Description: Ache  Aggravating Factors: movement  Easing Factors: activity avoidance, rest, and positiopn change    Pts goals: Pt reported goals are to improve pain and function    _______________________________________________________  Medical History:   No past medical history on file.    Surgical History:   Tano Ponce  has a past surgical history that includes Colonoscopy and Patellar tendon repair (Left, 11/9/2023).    Medications:   Tano has a current medication list which includes the following prescription(s): ibuprofen.    Allergies:   Review of patient's allergies indicates:  No Known Allergies       OBJECTIVE     Sensation:  Sensation is [x] Intact [] Impaired-- to light touch    Palpation: Increased tone and tenderness noted with palpation to:     KNEE-   Knee   Range of Motion Right   Left   Goal   Hyper - Zero - Flexion (cold)   0-0-130 0-10-95 0-0-130º   (*) pain and/        LE STRENGTH -   Lower Extremity  Strength RIGHT   Goal   Hip Flexion  []1  []2  []3  [x]4  []5                []+ []- 5/5 B    Hip Extension  []1  []2  []3  [x]4  []5                []+ []- 5/5 B   Hip Abduction  []1  []2  []3  [x]4  []5                []+ []- 5/5 B   Knee Flexion []1  []2  []3  []4  [x]5                []+ []- 5/5 B   Knee Extension []1  []2  []3  []4  [x]5                []+ []- 5/5 B   Ankle Dorsiflexion []1  []2  []3  []4  [x]5                []+ []- 5/5 B   Ankle Plantarflexion []1  []2  []3  []4  [x]5                []+ []- 5/5 B     Lower Extremity  Strength LEFT   Goal   Hip Flexion  []1  []2  []3  [x]4  []5                []+ []- 5/5 B    Hip Extension  []1  []2  []3  [x]4  []5                []+ []- 5/5 B   Hip Abduction  []1  []2  []3  [x]4  []5                []+ []- 5/5 B   Knee Flexion []1  []2  []3  []4  []5      NT     []+ []- 5/5 B   Knee Extension []1  []2  []3  []4  []5      NT     []+ []- 5/5 B   Ankle Dorsiflexion []1  []2  []3  [x]4  []5                []+ []-  5/5 B   Ankle Plantarflexion []1  []2  []3  [x]4  []5       NT    []+ []- 5/5 B      FUNCTION:     Intake Outcome Measure for FOTO KNEE Survey    Therapist reviewed FOTO scores for Tano Ponce on 1/3/2024.   FOTO documents entered into [employer] - see Media section.    Intake Score: 66%         TREATMENT     Total Treatment time separate from Evaluation: (30) minutes    Tano received the following interventions:       Therapeutic exercises to develop strength, ROM, flexibility, posture, and core stabilization for --- minutes including: ·    Activity   Details                                       Manual therapy techniques: Joint mobilizations, Manual traction, Myofacial release, and Soft tissue Mobilization were applied to the: left knee for --- minutes, including:     Activity   Details                                       Neuromuscular re-education activities to improve: Balance, Coordination, Kinesthetic, Sense, Proprioception, Posture for 30 minutes. The following activities were included:    Activity   Details    Quad sets x     SLR in brace (supine) x     Side lying hip abduction x     Prone hip extension x     NMES with Quad set x 10 reps 10 second on 50 seconds off with 2 second ramp time         Therapeutic activities to improve functional performance for ---  minutes, including:   Activity   Details                                              PATIENT EDUCATION AND HOME EXERCISES     Education/Self-Care provided:  (included in treatment) minutes   Patient educated on the impairments noted above and the effects of physical therapy intervention to improve overall condition and Quality of Life  Patient was educated on all the above exercise prior/during/after for proper posture, positioning, and execution for safe performance with home exercise program.     Written Home Exercises Provided: yes. Prefers: [x] Printed [] Electronic  Exercises were reviewed and Tano was able to demonstrate them prior  to the end of the session.  Tano demonstrated good understanding of the education provided. See EMR under Patient Instructions for exercises provided during therapy sessions.      ASSESSMENT     Tano is a 38 y.o. male referred to outpatient Physical Therapy with a medical diagnosis of   Encounter Diagnoses   Name Primary?    Rupture of left patellar tendon, subsequent encounter     Decreased range of motion of left knee Yes    Weakness of left lower extremity    .    Physical exam supports left knee impairments including: decreased range of motion, decreased muscular strength, decreased endurance, decreased muscular length/flexibility, impaired joint mobility, and impaired functional mobility.   Decreased functional left knee flexion  degrees.  Decreased functional left knee extension -10 degrees.  Inability to contract and sustain left knee quad setting.  Quad lag with left straight leg raise.  Antalgic gait pattern  The above impairments will be addressed through manual therapy techniques, therapeutic exercises, functional training, and modalities as necessary. Patient was treated and educated on exercises for home program, progression of therapy, and benefits of therapy to achieve full functional mobility.     The patient was active prior to injury with job demands which include sustained standing, lift, squatting, pushing and stair climbing.   He would like to also return to recreational activity including soccer..    Pt prognosis is Good.   Pt will benefit from skilled outpatient Physical Therapy to address the deficits stated above and in the chart below, provide pt/family education, and to maximize pt's level of independence.     Plan of care discussed with patient: Yes  Pt's spiritual, cultural and educational needs considered and patient is agreeable to the plan of care and goals as stated below:     Anticipated Barriers for therapy: none    Medical Necessity is demonstrated by the following:  "    History  Co-morbidities and personal factors that may impact the plan of care [x] LOW: no personal factors / co-morbidities  [] MODERATE: 1-2 personal factors / co-morbidities  [] HIGH: 3+ personal factors / co-morbidities    Moderate / High Support Documentation:   Co-morbidities affecting plan of care:   No past medical history on file.    Personal Factors:   no deficits     Examination  Body Structures and Functions, activity limitations and participation restrictions that may impact the plan of care [x] LOW: addressing 1-2 elements  [] MODERATE: 3+ elements  [] HIGH: 4+ elements (please support below)    Moderate / High Support Documentation:     Body Regions: lower extremities    Body Systems:  ROM  strength  gait    Participation Restrictions:   See above in "Current Level of Function"     Activity limitations:   Mobility: lifting and carrying objects  walking  driving (bike, car, motorcycle)    Self care: looking after one's health    Domestic Life: shopping  cooking  doing house work (cleaning house, washing dishes, laundry)  assisting others    Community and Social Life: community life, recreation and leisure      Clinical Presentation [x] LOW: stable  [] MODERATE: Evolving  [] HIGH: Unstable     Decision Making/ Complexity Score: low         SHORT TERM GOALS:  7 weeks Progress Date Met   Recent signs and systems trend is improving in order to progress towards Long term goals.  [] Met  [] Not Met  [] Progressing    Patient will be independent with Home Exercise Program  in order to further progress and return to maximal function. [] Met  [] Not Met  [] Progressing    Patient will improve left knee flexion to 115 degrees to improve function in gait and lifting     Patient will be able to correct postural deviations in sitting and standing, to decrease pain and promote postural awareness for injury prevention.  [] Met  [] Not Met  [] Progressing    Patient will improve functional outcome (FOTO) score: by " 5% to increase self-worth & perceived functional ability towards long term goals [] Met  [] Not Met  [] Progressing      LONG TERM GOALS: 12 weeks Progress Date Met   Patient will return to normal activites of daily living, recreational, and work related activities with less pain and limitation.  [] Met  [] Not Met  [] Progressing    Patient will improve range of motion  to stated goals in order to return to maximal functional potential.  [] Met  [] Not Met  [] Progressing    Patient will improve Strength to stated goals of appropriate musculature in order to improve functional independence.  [] Met  [] Not Met  [] Progressing    Pain Rating at Best: 0/10 to improve Quality of Life.  [] Met  [] Not Met  [] Progressing    Patient will meet predicted functional outcome (FOTO) score: 80% to increase self-worth & perceived functional ability. [] Met  [] Not Met  [] Progressing    Patient will have met/partially met personal goal of: improve pain and function in work and recreational activity [] Met  [] Not Met  [] Progressing          PLAN   Plan of care Certification: 1/3/2024 to 03/03/2024    Outpatient Physical Therapy 3 times weekly for 12 weeks to include any combination of the following interventions: virtual visits, dry needling, modalities, electrical stimulation (IFC, Pre-Mod, Attended with Functional Dry Needling), Manual Therapy, Moist Heat/ Ice, Neuromuscular Re-ed, Patient Education, Self Care, Therapeutic Exercise, Functional Training, and Therapeutic Activites     Thank you for this referral.    Hector Moran, PT

## 2024-01-05 ENCOUNTER — CLINICAL SUPPORT (OUTPATIENT)
Dept: REHABILITATION | Facility: HOSPITAL | Age: 39
End: 2024-01-05
Payer: COMMERCIAL

## 2024-01-05 DIAGNOSIS — M25.662 DECREASED RANGE OF MOTION OF LEFT KNEE: Primary | ICD-10-CM

## 2024-01-05 DIAGNOSIS — R29.898 WEAKNESS OF LEFT LOWER EXTREMITY: ICD-10-CM

## 2024-01-05 PROCEDURE — 97110 THERAPEUTIC EXERCISES: CPT | Mod: PN

## 2024-01-05 PROCEDURE — 97112 NEUROMUSCULAR REEDUCATION: CPT | Mod: PN

## 2024-01-06 NOTE — PROGRESS NOTES
[facility]   Physical Therapy Treatment Note      Name: Tano Ponce  Clinic Number: [id number]    Therapy Diagnosis:   Encounter Diagnoses   Name Primary?    Decreased range of motion of left knee Yes    Weakness of left lower extremity      Physician: Najma Liao DO    Visit Date: 1/5/2024    Physician Orders: PT Eval and Treat  Medical Diagnosis from Referral: Rupture of left patellar tendon   Evaluation Date: 1/3/2024  Authorization Period Expiration: 12/27/2025  Plan of Care Expiration: 03/03/2024                          Progress Update: 02/02/2024            Visit # / Visits authorized: 1 / 1          FOTO:  Knee  Number Date Score    1 01/03/2024 66%   2       3       4          PRECAUTIONS: Standard Precautions and Safety/fall precautions         Time In: 0643  Time Out: 0740  Total Appointment Time (timed & untimed codes): 57 minutes     12/27/2023  MD Referral note  Dx:  Left midsubstance patellar tendon tear, s/p repair 11/9/2023 by Dr. Carl  WBAT in brace  Brace was opened today to 30 degrees of flexion.  May increase 20 degrees per week as tolerated.   Needs to continue wearing brace all the time when not working with PT.    PTA Visit #: 0/5       Subjective     Patient reports: Minimal pain and performing HEP.  He was compliant with home exercise program.  Response to previous treatment: Good  Functional change: Improved unassisted quad contraction    Pain: 1/10  Location: left knee      Objective      Objective Measures updated at progress report unless specified.     Treatment     Tano received the treatments listed below:      Therapeutic exercises to develop strength, ROM, flexibility, posture, and core stabilization for 10 minutes including: ·     Activity   Details      Supine heel prop with 5#  x   5 minutes      Heel slides into knee flexion x                                             Manual therapy techniques: Joint  mobilizations, Manual traction, Myofacial release, and Soft tissue Mobilization were applied to the: left knee for --- minutes, including:      Activity   Details                                                               Neuromuscular re-education activities to improve: Balance, Coordination, Kinesthetic, Sense, Proprioception, Posture for 43 minutes. The following activities were included:     Activity   Details     Quad sets x       SLR in brace (supine) x       Side lying hip abduction x       Prone hip extension x       NMES with Quad set x 10 reps 10 second on 50 seconds off with 2 second ramp time     Seated LAQ 0-60  x      Mini wall squat x 30 degrees   Therapeutic activities to improve functional performance for ---  minutes, including:    Activity   Details                                                                     Patient Education and Home Exercises       Education provided:   - Home program    Written Home Exercises Provided: Patient instructed to cont prior HEP. Exercises were reviewed and Tano was able to demonstrate them prior to the end of the session.  Tano demonstrated good  understanding of the education provided. See Electronic Medical Record under Patient Instructions for exercises provided during therapy sessions    Assessment     The patient presents with left lower extremity in immobilizer locked 0-50.  He was instructed in quad setting with improved individual performance.  He has quad lag and will continue SLR in locked brace.      Tano Is progressing well towards his goals.   Patient prognosis is Good.     Patient will continue to benefit from skilled outpatient physical therapy to address the deficits listed in the problem list box on initial evaluation, provide pt/family education and to maximize pt's level of independence in the home and community environment.     Patient's spiritual, cultural and educational needs considered and pt agreeable to plan of care and  goals.     Anticipated barriers to physical therapy: None    SHORT TERM GOALS:  7 weeks Progress Date Met   Recent signs and systems trend is improving in order to progress towards Long term goals.  [] Met  [] Not Met  [] Progressing     Patient will be independent with Home Exercise Program  in order to further progress and return to maximal function. [] Met  [] Not Met  [] Progressing     Patient will improve left knee flexion to 115 degrees to improve function in gait and lifting       Patient will be able to correct postural deviations in sitting and standing, to decrease pain and promote postural awareness for injury prevention.  [] Met  [] Not Met  [] Progressing     Patient will improve functional outcome (FOTO) score: by 5% to increase self-worth & perceived functional ability towards long term goals [] Met  [] Not Met  [] Progressing        LONG TERM GOALS: 12 weeks Progress Date Met   Patient will return to normal activites of daily living, recreational, and work related activities with less pain and limitation.  [] Met  [] Not Met  [] Progressing     Patient will improve range of motion  to stated goals in order to return to maximal functional potential.  [] Met  [] Not Met  [] Progressing     Patient will improve Strength to stated goals of appropriate musculature in order to improve functional independence.  [] Met  [] Not Met  [] Progressing     Pain Rating at Best: 0/10 to improve Quality of Life.  [] Met  [] Not Met  [] Progressing     Patient will meet predicted functional outcome (FOTO) score: 80% to increase self-worth & perceived functional ability. [] Met  [] Not Met  [] Progressing     Patient will have met/partially met personal goal of: improve pain and function in work and recreational activity [] Met  [] Not Met  [] Progressing              PLAN   Plan of care Certification: 1/3/2024 to 03/03/2024     Outpatient Physical Therapy 3 times weekly for 12 weeks to include any combination of the  following interventions: virtual visits, dry needling, modalities, electrical stimulation (IFC, Pre-Mod, Attended with Functional Dry Needling), Manual Therapy, Moist Heat/ Ice, Neuromuscular Re-ed, Patient Education, Self Care, Therapeutic Exercise, Functional Training, and Therapeutic Activites       Hector Moran, PT

## 2024-01-08 ENCOUNTER — CLINICAL SUPPORT (OUTPATIENT)
Dept: REHABILITATION | Facility: HOSPITAL | Age: 39
End: 2024-01-08
Payer: COMMERCIAL

## 2024-01-08 DIAGNOSIS — R29.898 WEAKNESS OF LEFT LOWER EXTREMITY: ICD-10-CM

## 2024-01-08 DIAGNOSIS — M25.662 DECREASED RANGE OF MOTION OF LEFT KNEE: Primary | ICD-10-CM

## 2024-01-08 PROCEDURE — 97112 NEUROMUSCULAR REEDUCATION: CPT | Mod: PN

## 2024-01-08 PROCEDURE — 97110 THERAPEUTIC EXERCISES: CPT | Mod: PN

## 2024-01-08 PROCEDURE — 97014 ELECTRIC STIMULATION THERAPY: CPT | Mod: PN

## 2024-01-08 NOTE — PROGRESS NOTES
[facility]   Physical Therapy Treatment Note      Name: Tano Ponce  Clinic Number: [id number]    Therapy Diagnosis:   Encounter Diagnoses   Name Primary?    Decreased range of motion of left knee Yes    Weakness of left lower extremity      Physician: Najma Liao DO    Visit Date: 1/8/2024    Physician Orders: PT Eval and Treat  Medical Diagnosis from Referral: Rupture of left patellar tendon   Evaluation Date: 1/3/2024  Authorization Period Expiration: 12/27/2025  Plan of Care Expiration: 03/03/2024                          Progress Update: 02/02/2024            Visit # / Visits authorized: 1 / 1  :  2/20        FOTO:  Knee  Number Date Score    1 01/03/2024 66%   2       3       4          PRECAUTIONS: Standard Precautions and Safety/fall precautions         Time In: 0645  Time Out: 0730  Total Appointment Time (timed & untimed codes): 45 minutes     12/27/2023  MD Referral note  Dx:  Left midsubstance patellar tendon tear, s/p repair 11/9/2023 by Dr. Carl  WBAT in brace  Brace was opened today to 30 degrees of flexion.  May increase 20 degrees per week as tolerated.   Needs to continue wearing brace all the time when not working with PT.    PTA Visit #: 0/5       Subjective     Patient reports: Minimal pain and performing HEP.  He was compliant with home exercise program.  Response to previous treatment: Good  Functional change: Improved unassisted quad contraction    Pain: 1/10  Location: left knee      Objective      Objective Measures updated at progress report unless specified.     Treatment     Tano received the treatments listed below:      Therapeutic exercises to develop strength, ROM, flexibility, posture, and core stabilization for 10 minutes including: ·     Activity   Details      Supine heel prop with 5#  x   5 minutes      Heel slides into knee flexion x                                             Manual therapy techniques:  Joint mobilizations, Manual traction, Myofacial release, and Soft tissue Mobilization were applied to the: left knee for --- minutes, including:      Activity   Details                                                               Neuromuscular re-education activities to improve: Balance, Coordination, Kinesthetic, Sense, Proprioception, Posture for 35 minutes. The following activities were included:     Activity   Details     Quad sets x       SLR in brace (supine) x       Side lying hip abduction x       Prone hip extension x       NMES with Quad set x 10 reps 10 second on 50 seconds off with 2 second ramp time     Seated LAQ 0-60  x  Assisted     Mini wall squat  30 degrees   Therapeutic activities to improve functional performance for ---  minutes, including:    Activity   Details                                                                     Patient Education and Home Exercises       Education provided:   - Home program    Written Home Exercises Provided: Patient instructed to cont prior HEP. Exercises were reviewed and Tano was able to demonstrate them prior to the end of the session.  Tano demonstrated good  understanding of the education provided. See Electronic Medical Record under Patient Instructions for exercises provided during therapy sessions    Assessment     The patient presents with left lower extremity in immobilizer locked 0-50.  He was instructed in quad setting with improved individual performance.  He has quad lag and will continue SLR in locked brace.      Tano Is progressing well towards his goals.   Patient prognosis is Good.     Patient will continue to benefit from skilled outpatient physical therapy to address the deficits listed in the problem list box on initial evaluation, provide pt/family education and to maximize pt's level of independence in the home and community environment.     Patient's spiritual, cultural and educational needs considered and pt agreeable to plan  of care and goals.     Anticipated barriers to physical therapy: None    SHORT TERM GOALS:  7 weeks Progress Date Met   Recent signs and systems trend is improving in order to progress towards Long term goals.  [] Met  [] Not Met  [] Progressing     Patient will be independent with Home Exercise Program  in order to further progress and return to maximal function. [] Met  [] Not Met  [] Progressing     Patient will improve left knee flexion to 115 degrees to improve function in gait and lifting       Patient will be able to correct postural deviations in sitting and standing, to decrease pain and promote postural awareness for injury prevention.  [] Met  [] Not Met  [] Progressing     Patient will improve functional outcome (FOTO) score: by 5% to increase self-worth & perceived functional ability towards long term goals [] Met  [] Not Met  [] Progressing        LONG TERM GOALS: 12 weeks Progress Date Met   Patient will return to normal activites of daily living, recreational, and work related activities with less pain and limitation.  [] Met  [] Not Met  [] Progressing     Patient will improve range of motion  to stated goals in order to return to maximal functional potential.  [] Met  [] Not Met  [] Progressing     Patient will improve Strength to stated goals of appropriate musculature in order to improve functional independence.  [] Met  [] Not Met  [] Progressing     Pain Rating at Best: 0/10 to improve Quality of Life.  [] Met  [] Not Met  [] Progressing     Patient will meet predicted functional outcome (FOTO) score: 80% to increase self-worth & perceived functional ability. [] Met  [] Not Met  [] Progressing     Patient will have met/partially met personal goal of: improve pain and function in work and recreational activity [] Met  [] Not Met  [] Progressing              PLAN   Plan of care Certification: 1/3/2024 to 03/03/2024     Outpatient Physical Therapy 3 times weekly for 12 weeks to include any  combination of the following interventions: virtual visits, dry needling, modalities, electrical stimulation (IFC, Pre-Mod, Attended with Functional Dry Needling), Manual Therapy, Moist Heat/ Ice, Neuromuscular Re-ed, Patient Education, Self Care, Therapeutic Exercise, Functional Training, and Therapeutic Activites       Hector Moran, PT

## 2024-01-10 ENCOUNTER — CLINICAL SUPPORT (OUTPATIENT)
Dept: REHABILITATION | Facility: HOSPITAL | Age: 39
End: 2024-01-10
Payer: COMMERCIAL

## 2024-01-10 DIAGNOSIS — M25.662 DECREASED RANGE OF MOTION OF LEFT KNEE: Primary | ICD-10-CM

## 2024-01-10 DIAGNOSIS — R29.898 WEAKNESS OF LEFT LOWER EXTREMITY: ICD-10-CM

## 2024-01-10 PROCEDURE — 97140 MANUAL THERAPY 1/> REGIONS: CPT | Mod: PN

## 2024-01-10 PROCEDURE — 97112 NEUROMUSCULAR REEDUCATION: CPT | Mod: PN

## 2024-01-10 PROCEDURE — 97110 THERAPEUTIC EXERCISES: CPT | Mod: PN

## 2024-01-10 NOTE — PROGRESS NOTES
[facility]   Physical Therapy Treatment Note      Name: Tano Ponce  Clinic Number: [id number]    Therapy Diagnosis:   No diagnosis found.    Physician: Najma Liao DO    Visit Date: 1/10/2024    Physician Orders: PT Eval and Treat  Medical Diagnosis from Referral: Rupture of left patellar tendon   Evaluation Date: 1/3/2024  Authorization Period Expiration: 12/27/2025  Plan of Care Expiration: 03/03/2024                          Progress Update: 02/02/2024            Visit # / Visits authorized: 1 / 1  :  4/20        FOTO:  Knee  Number Date Score    1 01/03/2024 66%   2       3       4          PRECAUTIONS: Standard Precautions and Safety/fall precautions         Time In: 0635  Time Out: 0730  Total Appointment Time (timed & untimed codes): 55 minutes     12/27/2023  MD Referral note  Dx:  Left midsubstance patellar tendon tear, s/p repair 11/9/2023 by Dr. Carl  WBAT in brace  Brace was opened today to 30 degrees of flexion.  May increase 20 degrees per week as tolerated.   Needs to continue wearing brace all the time when not working with PT.    PTA Visit #: 0/5       Subjective     Patient reports: Minimal pain  He was compliant with home exercise program.  Response to previous treatment: Good  Functional change: Improved unassisted quad contraction    Pain: 1/10  Location: left knee      Objective      Objective Measures updated at progress report unless specified.     Treatment     Tano received the treatments listed below:      Therapeutic exercises to develop strength, ROM, flexibility, posture, and core stabilization for 10 minutes including: ·     Activity   Details      Supine heel prop with 5#  x   5 minutes      Heel slides into knee flexion x        calf stretch on wedge  x        Heel raises  x                         Manual therapy techniques: Joint mobilizations, Manual traction, Myofacial release, and Soft tissue Mobilization  were applied to the: left knee for 8 minutes, including:      Activity   Details      STM           medial lateral patella mobs                                               Neuromuscular re-education activities to improve: Balance, Coordination, Kinesthetic, Sense, Proprioception, Posture for 35 minutes. The following activities were included:     Activity   Details     Quad sets x       SLR in brace (supine) x       Side lying hip abduction x       Prone hip extension x       NMES with Quad set x 10 reps 10 second on 30 seconds off with 2 second ramp time     Seated LAQ 0-60  x  Assisted     Wall squat  30 degrees    Hamstring curl (standing) x                Therapeutic activities to improve functional performance for ---  minutes, including:    Activity   Details                                                                     Patient Education and Home Exercises       Education provided:   - Home program    Written Home Exercises Provided: Patient instructed to cont prior HEP. Exercises were reviewed and Tano was able to demonstrate them prior to the end of the session.  Tano demonstrated good  understanding of the education provided. See Electronic Medical Record under Patient Instructions for exercises provided during therapy sessions    Assessment     The patient presents with left lower extremity in immobilizer locked 0-50.  He was instructed in quad setting with improved individual performance.  He has quad lag and will continue SLR in locked brace.      Tano Is progressing well towards his goals.   Patient prognosis is Good.     Patient will continue to benefit from skilled outpatient physical therapy to address the deficits listed in the problem list box on initial evaluation, provide pt/family education and to maximize pt's level of independence in the home and community environment.     Patient's spiritual, cultural and educational needs considered and pt agreeable to plan of care and  goals.     Anticipated barriers to physical therapy: None    SHORT TERM GOALS:  7 weeks Progress Date Met   Recent signs and systems trend is improving in order to progress towards Long term goals.  [] Met  [] Not Met  [] Progressing     Patient will be independent with Home Exercise Program  in order to further progress and return to maximal function. [] Met  [] Not Met  [] Progressing     Patient will improve left knee flexion to 115 degrees to improve function in gait and lifting       Patient will be able to correct postural deviations in sitting and standing, to decrease pain and promote postural awareness for injury prevention.  [] Met  [] Not Met  [] Progressing     Patient will improve functional outcome (FOTO) score: by 5% to increase self-worth & perceived functional ability towards long term goals [] Met  [] Not Met  [] Progressing        LONG TERM GOALS: 12 weeks Progress Date Met   Patient will return to normal activites of daily living, recreational, and work related activities with less pain and limitation.  [] Met  [] Not Met  [] Progressing     Patient will improve range of motion  to stated goals in order to return to maximal functional potential.  [] Met  [] Not Met  [] Progressing     Patient will improve Strength to stated goals of appropriate musculature in order to improve functional independence.  [] Met  [] Not Met  [] Progressing     Pain Rating at Best: 0/10 to improve Quality of Life.  [] Met  [] Not Met  [] Progressing     Patient will meet predicted functional outcome (FOTO) score: 80% to increase self-worth & perceived functional ability. [] Met  [] Not Met  [] Progressing     Patient will have met/partially met personal goal of: improve pain and function in work and recreational activity [] Met  [] Not Met  [] Progressing              PLAN   Plan of care Certification: 1/3/2024 to 03/03/2024     Outpatient Physical Therapy 3 times weekly for 12 weeks to include any combination of the  following interventions: virtual visits, dry needling, modalities, electrical stimulation (IFC, Pre-Mod, Attended with Functional Dry Needling), Manual Therapy, Moist Heat/ Ice, Neuromuscular Re-ed, Patient Education, Self Care, Therapeutic Exercise, Functional Training, and Therapeutic Activites       Hector Moran, PT

## 2024-01-12 ENCOUNTER — CLINICAL SUPPORT (OUTPATIENT)
Dept: REHABILITATION | Facility: HOSPITAL | Age: 39
End: 2024-01-12
Payer: COMMERCIAL

## 2024-01-12 DIAGNOSIS — M25.662 DECREASED RANGE OF MOTION OF LEFT KNEE: Primary | ICD-10-CM

## 2024-01-12 DIAGNOSIS — R29.898 WEAKNESS OF LEFT LOWER EXTREMITY: ICD-10-CM

## 2024-01-12 PROCEDURE — 97110 THERAPEUTIC EXERCISES: CPT | Mod: PN

## 2024-01-12 PROCEDURE — 97140 MANUAL THERAPY 1/> REGIONS: CPT | Mod: PN

## 2024-01-12 PROCEDURE — 97112 NEUROMUSCULAR REEDUCATION: CPT | Mod: PN

## 2024-01-12 NOTE — PROGRESS NOTES
[facility]   Physical Therapy Treatment Note      Name: Tano Ponce  Clinic Number: [id number]    Therapy Diagnosis:   Encounter Diagnoses   Name Primary?    Decreased range of motion of left knee Yes    Weakness of left lower extremity        Physician: Najma Liao DO    Visit Date: 1/12/2024    Physician Orders: PT Eval and Treat  Medical Diagnosis from Referral: Rupture of left patellar tendon   Evaluation Date: 1/3/2024  Authorization Period Expiration: 12/27/2025  Plan of Care Expiration: 03/03/2024                          Progress Update: 02/02/2024            Visit # / Visits authorized: 1 / 1  :  5/20        FOTO:  Knee  Number Date Score    1 01/03/2024 66%   2       3       4          PRECAUTIONS: Standard Precautions and Safety/fall precautions         Time In: 0735  Time Out: 0830  Total Appointment Time (timed & untimed codes): 55 minutes     12/27/2023  MD Referral note  Dx:  Left midsubstance patellar tendon tear, s/p repair 11/9/2023 by Dr. Carl  WBAT in brace  Brace was opened today to 30 degrees of flexion.  May increase 20 degrees per week as tolerated.   Needs to continue wearing brace all the time when not working with PT.    PTA Visit #: 0/5       Subjective     Patient reports: Minimal pain  He was compliant with home exercise program.  Response to previous treatment: Good  Functional change: Improved unassisted quad contraction    Pain: 1/10  Location: left knee      Objective      Objective Measures updated at progress report unless specified.   01/12/2024  left knee flexion 100 degrees and full extension  Treatment     Tano received the treatments listed below:      Therapeutic exercises to develop strength, ROM, flexibility, posture, and core stabilization for 10 minutes including: ·     Activity   Details      Supine heel prop with 5#  x   5 minutes      Heel slides into knee flexion x        calf stretch on wedge  x        Heel raises  x                          Manual therapy techniques: Joint mobilizations, Manual traction, Myofacial release, and Soft tissue Mobilization were applied to the: left knee for 8 minutes, including:      Activity   Details      STM           medial lateral patella mobs                                               Neuromuscular re-education activities to improve: Balance, Coordination, Kinesthetic, Sense, Proprioception, Posture for 35 minutes. The following activities were included:     Activity   Details     Quad sets x       SLR  (supine) x       Side lying hip abduction x       Prone hip extension x       NMES with Quad set x 10 reps 10 second on 30 seconds off with 2 second ramp time     Seated LAQ 0-60  x  Assisted     Wall squat  30 degrees    Hamstring curl (standing) x                Therapeutic activities to improve functional performance for ---  minutes, including:    Activity   Details                                                                     Patient Education and Home Exercises       Education provided:   - Home program    Written Home Exercises Provided: Patient instructed to cont prior HEP. Exercises were reviewed and Tano was able to demonstrate them prior to the end of the session.  Tano demonstrated good  understanding of the education provided. See Electronic Medical Record under Patient Instructions for exercises provided during therapy sessions    Assessment     The patient presents with left lower extremity in immobilizer locked 0-70.  He was instructed in quad setting with improved individual performance.  His left quad function has improved    Tano Is progressing well towards his goals.   Patient prognosis is Good.     Patient will continue to benefit from skilled outpatient physical therapy to address the deficits listed in the problem list box on initial evaluation, provide pt/family education and to maximize pt's level of independence in the home and community environment.      Patient's spiritual, cultural and educational needs considered and pt agreeable to plan of care and goals.     Anticipated barriers to physical therapy: None    SHORT TERM GOALS:  7 weeks Progress Date Met   Recent signs and systems trend is improving in order to progress towards Long term goals.  [] Met  [] Not Met  [] Progressing     Patient will be independent with Home Exercise Program  in order to further progress and return to maximal function. [] Met  [] Not Met  [] Progressing     Patient will improve left knee flexion to 115 degrees to improve function in gait and lifting       Patient will be able to correct postural deviations in sitting and standing, to decrease pain and promote postural awareness for injury prevention.  [] Met  [] Not Met  [] Progressing     Patient will improve functional outcome (FOTO) score: by 5% to increase self-worth & perceived functional ability towards long term goals [] Met  [] Not Met  [] Progressing        LONG TERM GOALS: 12 weeks Progress Date Met   Patient will return to normal activites of daily living, recreational, and work related activities with less pain and limitation.  [] Met  [] Not Met  [] Progressing     Patient will improve range of motion  to stated goals in order to return to maximal functional potential.  [] Met  [] Not Met  [] Progressing     Patient will improve Strength to stated goals of appropriate musculature in order to improve functional independence.  [] Met  [] Not Met  [] Progressing     Pain Rating at Best: 0/10 to improve Quality of Life.  [] Met  [] Not Met  [] Progressing     Patient will meet predicted functional outcome (FOTO) score: 80% to increase self-worth & perceived functional ability. [] Met  [] Not Met  [] Progressing     Patient will have met/partially met personal goal of: improve pain and function in work and recreational activity [] Met  [] Not Met  [] Progressing              PLAN   Plan of care Certification: 1/3/2024 to  03/03/2024     Outpatient Physical Therapy 3 times weekly for 12 weeks to include any combination of the following interventions: virtual visits, dry needling, modalities, electrical stimulation (IFC, Pre-Mod, Attended with Functional Dry Needling), Manual Therapy, Moist Heat/ Ice, Neuromuscular Re-ed, Patient Education, Self Care, Therapeutic Exercise, Functional Training, and Therapeutic Activites       Hector Moran, PT

## 2024-01-16 ENCOUNTER — CLINICAL SUPPORT (OUTPATIENT)
Dept: REHABILITATION | Facility: HOSPITAL | Age: 39
End: 2024-01-16
Payer: COMMERCIAL

## 2024-01-16 DIAGNOSIS — M25.662 DECREASED RANGE OF MOTION OF LEFT KNEE: Primary | ICD-10-CM

## 2024-01-16 DIAGNOSIS — R29.898 WEAKNESS OF LEFT LOWER EXTREMITY: ICD-10-CM

## 2024-01-16 PROCEDURE — 97112 NEUROMUSCULAR REEDUCATION: CPT | Mod: PN

## 2024-01-16 PROCEDURE — 97140 MANUAL THERAPY 1/> REGIONS: CPT | Mod: PN

## 2024-01-16 PROCEDURE — 97110 THERAPEUTIC EXERCISES: CPT | Mod: PN

## 2024-01-16 PROCEDURE — 97014 ELECTRIC STIMULATION THERAPY: CPT | Mod: PN

## 2024-01-16 NOTE — PROGRESS NOTES
[facility]   Physical Therapy Treatment Note      Name: Tano Ponce  Clinic Number: [id number]    Therapy Diagnosis:   No diagnosis found.      Physician: Najma Liao DO    Visit Date: 1/16/2024    Physician Orders: PT Eval and Treat  Medical Diagnosis from Referral: Rupture of left patellar tendon   Evaluation Date: 1/3/2024  Authorization Period Expiration: 12/27/2025  Plan of Care Expiration: 03/03/2024                          Progress Update: 02/02/2024            Visit # / Visits authorized: 1 / 1  :  5/20        FOTO:  Knee  Number Date Score    1 01/03/2024 66%   2       3       4          PRECAUTIONS: Standard Precautions and Safety/fall precautions         Time In: 0730  Time Out: 0830  Total Appointment Time (timed & untimed codes): 60 minutes     12/27/2023  MD Referral note  Dx:  Left midsubstance patellar tendon tear, s/p repair 11/9/2023 by Dr. Carl  WBAT in brace  Brace was opened today to 30 degrees of flexion.  May increase 20 degrees per week as tolerated.   Needs to continue wearing brace all the time when not working with PT.    PTA Visit #: 0/5       Subjective     Patient reports: minimal pain.  He has complaints mid-patella discomfort during activity.  He states it feel better if he puts tension on the muscle.  He was compliant with home exercise program.  Response to previous treatment: Good  Functional change: Improved unassisted quad contraction    Pain: 1/10  Location: left knee      Objective      Objective Measures updated at progress report unless specified.     Treatment     Tano received the treatments listed below:      Therapeutic exercises to develop strength, ROM, flexibility, posture, and core stabilization for 15 minutes including: ·     Activity   Details      Supine heel prop with 5#  x   5 minutes      Heel slides into knee flexion x        calf stretch on wedge  x        Heel raises  x                         Manual therapy  techniques: Joint mobilizations, Manual traction, Myofacial release, and Soft tissue Mobilization were applied to the: left knee for 8 minutes, including:      Activity   Details      STM           medial lateral patella mobs                                               Neuromuscular re-education activities to improve: Balance, Coordination, Kinesthetic, Sense, Proprioception, Posture for 35 minutes. The following activities were included:     Activity   Details     Quad sets x       SLR  (supine) x       Side lying hip abduction x       Prone hip extension x       NMES with Quad set x 10 reps 10 second on 30 seconds off with 2 second ramp time     Seated LAQ 0-60  x  Assisted     Wall squat  30 degrees    Hamstring curl (standing) x                Therapeutic activities to improve functional performance for ---  minutes, including:    Activity   Details                                                                     Patient Education and Home Exercises       Education provided:   - Home program    Written Home Exercises Provided: Patient instructed to cont prior HEP. Exercises were reviewed and Tano was able to demonstrate them prior to the end of the session.  Tano demonstrated good  understanding of the education provided. See Electronic Medical Record under Patient Instructions for exercises provided during therapy sessions    Assessment     The patient presents with left lower extremity in immobilizer locked 0-70.  He was instructed in quad setting with improved individual performance.  His left quad function has improved    Tano Is progressing well towards his goals.   Patient prognosis is Good.     Patient will continue to benefit from skilled outpatient physical therapy to address the deficits listed in the problem list box on initial evaluation, provide pt/family education and to maximize pt's level of independence in the home and community environment.     Patient's spiritual, cultural and  educational needs considered and pt agreeable to plan of care and goals.     Anticipated barriers to physical therapy: None    SHORT TERM GOALS:  7 weeks Progress Date Met   Recent signs and systems trend is improving in order to progress towards Long term goals.  [] Met  [] Not Met  [] Progressing     Patient will be independent with Home Exercise Program  in order to further progress and return to maximal function. [] Met  [] Not Met  [] Progressing     Patient will improve left knee flexion to 115 degrees to improve function in gait and lifting       Patient will be able to correct postural deviations in sitting and standing, to decrease pain and promote postural awareness for injury prevention.  [] Met  [] Not Met  [] Progressing     Patient will improve functional outcome (FOTO) score: by 5% to increase self-worth & perceived functional ability towards long term goals [] Met  [] Not Met  [] Progressing        LONG TERM GOALS: 12 weeks Progress Date Met   Patient will return to normal activites of daily living, recreational, and work related activities with less pain and limitation.  [] Met  [] Not Met  [] Progressing     Patient will improve range of motion  to stated goals in order to return to maximal functional potential.  [] Met  [] Not Met  [] Progressing     Patient will improve Strength to stated goals of appropriate musculature in order to improve functional independence.  [] Met  [] Not Met  [] Progressing     Pain Rating at Best: 0/10 to improve Quality of Life.  [] Met  [] Not Met  [] Progressing     Patient will meet predicted functional outcome (FOTO) score: 80% to increase self-worth & perceived functional ability. [] Met  [] Not Met  [] Progressing     Patient will have met/partially met personal goal of: improve pain and function in work and recreational activity [] Met  [] Not Met  [] Progressing              PLAN   Plan of care Certification: 1/3/2024 to 03/03/2024     Outpatient Physical  Therapy 3 times weekly for 12 weeks to include any combination of the following interventions: virtual visits, dry needling, modalities, electrical stimulation (IFC, Pre-Mod, Attended with Functional Dry Needling), Manual Therapy, Moist Heat/ Ice, Neuromuscular Re-ed, Patient Education, Self Care, Therapeutic Exercise, Functional Training, and Therapeutic Activites       Hector Moran, PT

## 2024-01-17 ENCOUNTER — CLINICAL SUPPORT (OUTPATIENT)
Dept: REHABILITATION | Facility: HOSPITAL | Age: 39
End: 2024-01-17
Payer: COMMERCIAL

## 2024-01-17 DIAGNOSIS — M25.662 DECREASED RANGE OF MOTION OF LEFT KNEE: Primary | ICD-10-CM

## 2024-01-17 DIAGNOSIS — R29.898 WEAKNESS OF LEFT LOWER EXTREMITY: ICD-10-CM

## 2024-01-17 PROCEDURE — 97112 NEUROMUSCULAR REEDUCATION: CPT | Mod: PN

## 2024-01-17 PROCEDURE — 97140 MANUAL THERAPY 1/> REGIONS: CPT | Mod: PN

## 2024-01-17 PROCEDURE — 97014 ELECTRIC STIMULATION THERAPY: CPT | Mod: PN

## 2024-01-17 PROCEDURE — 97110 THERAPEUTIC EXERCISES: CPT | Mod: PN

## 2024-01-17 NOTE — PROGRESS NOTES
[facility]   Physical Therapy Treatment Note      Name: Tano [place]  Clinic Number: [id number]    Therapy Diagnosis:   Encounter Diagnoses   Name Primary?    Decreased range of motion of left knee Yes    Weakness of left lower extremity          Physician: Najma Liao DO    Visit Date: 1/17/2024    Physician Orders: PT Eval and Treat  Medical Diagnosis from Referral: Rupture of left patellar tendon   Evaluation Date: 1/3/2024  Authorization Period Expiration: 12/27/2025  Plan of Care Expiration: 03/03/2024                          Progress Update: 02/02/2024            Visit # / Visits authorized: 1 / 1  :  6/20        FOTO:  Knee  Number Date Score    1 01/03/2024 66%   2       3       4          PRECAUTIONS: Standard Precautions and Safety/fall precautions         Time In: 0745  Time Out: 0905  Total Appointment Time (timed & untimed codes): 60 minutes     12/27/2023  MD Referral note  Dx:  Left midsubstance patellar tendon tear, s/p repair 11/9/2023 by Dr. Carl  WBAT in brace  Brace was opened today to 30 degrees of flexion.  May increase 20 degrees per week as tolerated.   Needs to continue wearing brace all the time when not working with PT.    PTA Visit #: 0/5       Subjective     Patient reports: minimal pain.  He has complaints mid-patella discomfort during activity.   States he was in a hurry a forgot his brace  He was compliant with home exercise program.  Response to previous treatment: Good  Functional change: Improved unassisted quad contraction    Pain: 1/10  Location: left knee      Objective      Objective Measures updated at progress report unless specified.     Treatment     Tano received the treatments listed below:      Therapeutic exercises to develop strength, ROM, flexibility, posture, and core stabilization for 15 minutes including: ·     Activity   Details      Supine heel prop with 10#  x   5 minutes      Heel slides into knee flexion x         calf stretch on wedge  x        Heel raises  x                         Manual therapy techniques: Joint mobilizations, Manual traction, Myofacial release, and Soft tissue Mobilization were applied to the: left knee for 8 minutes, including:      Activity   Details      STM           Patella mobs x                                              Neuromuscular re-education activities to improve: Balance, Coordination, Kinesthetic, Sense, Proprioception, Posture for 45 minutes. The following activities were included:     Activity   Details     Quad sets x       SLR  (supine) x  3x10 assist to control lag    Supine SAQ x 3x10 with assist to full extension and eccentric lowering     Side lying hip abduction x  3x10     Prone hip extension x  3x10     NMES with Quad set x 10 reps 10 second on 20 seconds off with 2 second ramp time     Seated LAQ 0-60  x  Assisted     Wall squat with ball x 30 degrees    Matrix hamstring curl x 30 3x10    Shuttle squats x 5 bands 2 minutes    Standing TKE with band  3x10   Therapeutic activities to improve functional performance for ---  minutes, including:    Activity   Details                                                                     Patient Education and Home Exercises       Education provided:   - Home program    Written Home Exercises Provided: Patient instructed to cont prior HEP. Exercises were reviewed and Tano was able to demonstrate them prior to the end of the session.  Tano demonstrated good  understanding of the education provided. See Electronic Medical Record under Patient Instructions for exercises provided during therapy sessions    Assessment     The patient presents with left lower extremity in immobilizer locked 0-70.  He was instructed in quad setting with improved individual performance.  His left quad function has improved    Tano Is progressing well towards his goals.   Patient prognosis is Good.     Patient will continue to benefit from skilled  outpatient physical therapy to address the deficits listed in the problem list box on initial evaluation, provide pt/family education and to maximize pt's level of independence in the home and community environment.     Patient's spiritual, cultural and educational needs considered and pt agreeable to plan of care and goals.     Anticipated barriers to physical therapy: None    SHORT TERM GOALS:  7 weeks Progress Date Met   Recent signs and systems trend is improving in order to progress towards Long term goals.  [] Met  [] Not Met  [] Progressing     Patient will be independent with Home Exercise Program  in order to further progress and return to maximal function. [] Met  [] Not Met  [] Progressing     Patient will improve left knee flexion to 115 degrees to improve function in gait and lifting       Patient will be able to correct postural deviations in sitting and standing, to decrease pain and promote postural awareness for injury prevention.  [] Met  [] Not Met  [] Progressing     Patient will improve functional outcome (FOTO) score: by 5% to increase self-worth & perceived functional ability towards long term goals [] Met  [] Not Met  [] Progressing        LONG TERM GOALS: 12 weeks Progress Date Met   Patient will return to normal activites of daily living, recreational, and work related activities with less pain and limitation.  [] Met  [] Not Met  [] Progressing     Patient will improve range of motion  to stated goals in order to return to maximal functional potential.  [] Met  [] Not Met  [] Progressing     Patient will improve Strength to stated goals of appropriate musculature in order to improve functional independence.  [] Met  [] Not Met  [] Progressing     Pain Rating at Best: 0/10 to improve Quality of Life.  [] Met  [] Not Met  [] Progressing     Patient will meet predicted functional outcome (FOTO) score: 80% to increase self-worth & perceived functional ability. [] Met  [] Not Met  []  Progressing     Patient will have met/partially met personal goal of: improve pain and function in work and recreational activity [] Met  [] Not Met  [] Progressing              PLAN   Plan of care Certification: 1/3/2024 to 03/03/2024     Outpatient Physical Therapy 3 times weekly for 12 weeks to include any combination of the following interventions: virtual visits, dry needling, modalities, electrical stimulation (IFC, Pre-Mod, Attended with Functional Dry Needling), Manual Therapy, Moist Heat/ Ice, Neuromuscular Re-ed, Patient Education, Self Care, Therapeutic Exercise, Functional Training, and Therapeutic Activites       Hector Moran, PT

## 2024-01-22 ENCOUNTER — HOSPITAL ENCOUNTER (OUTPATIENT)
Dept: RADIOLOGY | Facility: HOSPITAL | Age: 39
Discharge: HOME | End: 2024-01-22
Attending: PHYSICIAN ASSISTANT
Payer: COMMERCIAL

## 2024-01-22 ENCOUNTER — OFFICE VISIT (OUTPATIENT)
Dept: ORTHOPEDICS | Facility: CLINIC | Age: 39
End: 2024-01-22
Payer: COMMERCIAL

## 2024-01-22 VITALS
DIASTOLIC BLOOD PRESSURE: 80 MMHG | SYSTOLIC BLOOD PRESSURE: 138 MMHG | HEART RATE: 68 BPM | HEIGHT: 69 IN | TEMPERATURE: 97 F | BODY MASS INDEX: 36.58 KG/M2 | WEIGHT: 247 LBS

## 2024-01-22 DIAGNOSIS — S86.812D RUPTURE OF LEFT PATELLAR TENDON, SUBSEQUENT ENCOUNTER: Primary | ICD-10-CM

## 2024-01-22 DIAGNOSIS — M25.562 LEFT KNEE PAIN, UNSPECIFIED CHRONICITY: ICD-10-CM

## 2024-01-22 DIAGNOSIS — M25.562 LEFT KNEE PAIN, UNSPECIFIED CHRONICITY: Primary | ICD-10-CM

## 2024-01-22 PROCEDURE — 3075F SYST BP GE 130 - 139MM HG: CPT | Mod: CPTII,S$GLB,, | Performed by: PHYSICIAN ASSISTANT

## 2024-01-22 PROCEDURE — 73564 X-RAY EXAM KNEE 4 OR MORE: CPT | Mod: 26,LT,, | Performed by: RADIOLOGY

## 2024-01-22 PROCEDURE — 73564 X-RAY EXAM KNEE 4 OR MORE: CPT | Mod: TC,LT

## 2024-01-22 PROCEDURE — 3079F DIAST BP 80-89 MM HG: CPT | Mod: CPTII,S$GLB,, | Performed by: PHYSICIAN ASSISTANT

## 2024-01-22 PROCEDURE — 1159F MED LIST DOCD IN RCRD: CPT | Mod: CPTII,S$GLB,, | Performed by: PHYSICIAN ASSISTANT

## 2024-01-22 PROCEDURE — 99999 PR PBB SHADOW E&M-EST. PATIENT-LVL III: CPT | Mod: PBBFAC,,, | Performed by: PHYSICIAN ASSISTANT

## 2024-01-22 PROCEDURE — 99024 POSTOP FOLLOW-UP VISIT: CPT | Mod: S$GLB,,, | Performed by: PHYSICIAN ASSISTANT

## 2024-01-22 PROCEDURE — 1160F RVW MEDS BY RX/DR IN RCRD: CPT | Mod: CPTII,S$GLB,, | Performed by: PHYSICIAN ASSISTANT

## 2024-01-22 NOTE — PROGRESS NOTES
"  Subjective:      Patient ID: Tano Ponce is a 38 y.o. male.    Chief Complaint: Post-op Evaluation of the Left Knee (C/o swelling )      HPI: Tano Ponce is a 38-year-old male in clinic today for postoperative follow-up.  Patient is 2-1/2 months status post operative repair of left patellar tendon rupture.  Patient is doing very well this time.  He is fully weight-bearing without any pain.  He is going to therapy for ROM and strengthening.  He complains of some pain at the anterior aspect of the patella, but only when fully extending the knee.    History reviewed. No pertinent past medical history.    Current Outpatient Medications:     ibuprofen (ADVIL,MOTRIN) 200 MG tablet, Take 200 mg by mouth every 6 (six) hours as needed for Pain., Disp: , Rfl:   Review of patient's allergies indicates:  No Known Allergies    /80 (BP Location: Left forearm, Patient Position: Sitting, BP Method: Medium (Automatic))   Pulse 68   Temp 97.2 °F (36.2 °C) (Oral)   Ht 5' 9" (1.753 m)   Wt 112 kg (247 lb)   BMI 36.48 kg/m²     ROS      Objective:    Ortho Exam   Left knee:  Hinged knee brace is in place and properly fitting   Incision is well healed, no signs of infection  Mild edema   Mild TTP of the anterior aspect of the patella   No defect noted   ROM: Extension 0°, flexion 75°   Calf and compartments are soft and compressible  Motor exam normal   Sensation and pulses intact  Cap refill brisk    GEN: Well developed, well nourished male. AAOX3. No acute distress.   Head: Normocephalic, atraumatic.   Eyes: ELDA  Neck: Trachea is midline, no adenopathy  Resp: Breathing unlabored.  Neuro: Motor function normal, Cranial nerves intact  Psych: Mood and affect appropriate.       Assessment:     Imaging:  X-ray left knee obtained today shows no acute fracture or dislocation.  There are some mild degenerative changes and calcific density at the patellar tendon, likely posttraumatic.        1. Rupture of left " patellar tendon, subsequent encounter          Plan:       Encouraged patient on the progress he is made so far.  He should continue to work with therapy on ROM and strengthening.  Use the brace for all activities, but he may have it off while relaxing.  Continue to ice and elevate the knee to help with swelling.  We will see him back in clinic in about 2 weeks for further evaluation.       Follow up in about 2 weeks (around 2/5/2024).          Patient note was created using MModal Dictation.  Any errors in syntax or even information may not have been identified and edited on initial review prior to signing this note.

## 2024-01-23 ENCOUNTER — CLINICAL SUPPORT (OUTPATIENT)
Dept: REHABILITATION | Facility: HOSPITAL | Age: 39
End: 2024-01-23
Payer: COMMERCIAL

## 2024-01-23 DIAGNOSIS — R29.898 WEAKNESS OF LEFT LOWER EXTREMITY: ICD-10-CM

## 2024-01-23 DIAGNOSIS — M25.662 DECREASED RANGE OF MOTION OF LEFT KNEE: Primary | ICD-10-CM

## 2024-01-23 PROCEDURE — 97110 THERAPEUTIC EXERCISES: CPT | Mod: PN

## 2024-01-23 PROCEDURE — 97112 NEUROMUSCULAR REEDUCATION: CPT | Mod: PN

## 2024-01-23 PROCEDURE — 97140 MANUAL THERAPY 1/> REGIONS: CPT | Mod: PN

## 2024-01-23 NOTE — PROGRESS NOTES
[facility]   Physical Therapy Treatment Note      Name: Tano [place]  Clinic Number: [id number]    Therapy Diagnosis:   Encounter Diagnoses   Name Primary?    Decreased range of motion of left knee Yes    Weakness of left lower extremity      Physician: Najma Liao DO    Visit Date: 1/23/2024    Physician Orders: PT Eval and Treat  Medical Diagnosis from Referral: Rupture of left patellar tendon   Evaluation Date: 1/3/2024  Authorization Period Expiration: 12/27/2025  Plan of Care Expiration: 03/03/2024                          Progress Update: 02/02/2024            Visit # / Visits authorized: 1 / 1  :  7/20        FOTO:  Knee  Number Date Score    1 01/03/2024 66%   2       3       4          PRECAUTIONS: Standard Precautions and Safety/fall precautions         Time In: 0740  Time Out: 0835  Total Appointment Time (timed & untimed codes): 60 minutes     12/27/2023  MD Referral note  Dx:  Left midsubstance patellar tendon tear, s/p repair 11/9/2023 by Dr. Carl  WBAT in brace  Brace was opened today to 30 degrees of flexion.  May increase 20 degrees per week as tolerated.   Needs to continue wearing brace all the time when not working with PT.    PTA Visit #: 0/5       Subjective     Patient reports: minimal pain.  He has complaints mid-patella discomfort during activity.   States he was in a hurry a forgot his brace  He was compliant with home exercise program.  Response to previous treatment: Good  Functional change: Improved unassisted quad contraction    Pain: 1/10  Location: left knee      Objective      Objective Measures updated at progress report unless specified.     Treatment     Tano received the treatments listed below:      Therapeutic exercises to develop strength, ROM, flexibility, posture, and core stabilization for 15 minutes including: ·     Activity   Details      Supine heel prop with 10#  x  5 minutes      Heel slides into knee flexion x         Calf stretch on wedge x        Heel raises x        Long sitting hamstring stretch x              Manual therapy techniques: Joint mobilizations, Manual traction, Myofacial release, and Soft tissue Mobilization were applied to the: left knee for 8 minutes, including:      Activity   Details      STM           Patella mobs x                                              Neuromuscular re-education activities to improve: Balance, Coordination, Kinesthetic, Sense, Proprioception, Posture for 45 minutes. The following activities were included:     Activity   Details     Quad sets x       SLR  (supine) x  3x10 assist to control lag    Supine SAQ x 3x10 with assist to full extension and eccentric lowering     Side lying hip abduction x  3x10     Prone hip extension x  3x10     NMES with Quad set x 10 reps 10 second on 20 seconds off with 2 second ramp time     Seated LAQ 0-60  x  Assisted     Wall squat with ball x 30 degrees    Matrix hamstring curl x 30 3x10    Shuttle squats x 5 bands 2 minutes    Standing TKE with band  3x10         Therapeutic activities to improve functional performance for ---  minutes, including:    Activity   Details                                                                     Patient Education and Home Exercises       Education provided:   - Home program    Written Home Exercises Provided: Patient instructed to cont prior HEP. Exercises were reviewed and Tano was able to demonstrate them prior to the end of the session.  Tano demonstrated good  understanding of the education provided. See Electronic Medical Record under Patient Instructions for exercises provided during therapy sessions    Assessment     The patient presents with left lower extremity in immobilizer locked 0-90.  He was instructed in quad setting with improved individual performance.  His left quad function has improved and flexion has improved to 120 degrees    Tano Is progressing well towards his goals.   Patient  prognosis is Good.     Patient will continue to benefit from skilled outpatient physical therapy to address the deficits listed in the problem list box on initial evaluation, provide pt/family education and to maximize pt's level of independence in the home and community environment.     Patient's spiritual, cultural and educational needs considered and pt agreeable to plan of care and goals.     Anticipated barriers to physical therapy: None    SHORT TERM GOALS:  7 weeks Progress Date Met   Recent signs and systems trend is improving in order to progress towards Long term goals.  [] Met  [] Not Met  [] Progressing     Patient will be independent with Home Exercise Program  in order to further progress and return to maximal function. [] Met  [] Not Met  [] Progressing     Patient will improve left knee flexion to 115 degrees to improve function in gait and lifting       Patient will be able to correct postural deviations in sitting and standing, to decrease pain and promote postural awareness for injury prevention.  [] Met  [] Not Met  [] Progressing     Patient will improve functional outcome (FOTO) score: by 5% to increase self-worth & perceived functional ability towards long term goals [] Met  [] Not Met  [] Progressing        LONG TERM GOALS: 12 weeks Progress Date Met   Patient will return to normal activites of daily living, recreational, and work related activities with less pain and limitation.  [] Met  [] Not Met  [] Progressing     Patient will improve range of motion  to stated goals in order to return to maximal functional potential.  [] Met  [] Not Met  [] Progressing     Patient will improve Strength to stated goals of appropriate musculature in order to improve functional independence.  [] Met  [] Not Met  [] Progressing     Pain Rating at Best: 0/10 to improve Quality of Life.  [] Met  [] Not Met  [] Progressing     Patient will meet predicted functional outcome (FOTO) score: 80% to increase  self-worth & perceived functional ability. [] Met  [] Not Met  [] Progressing     Patient will have met/partially met personal goal of: improve pain and function in work and recreational activity [] Met  [] Not Met  [] Progressing              PLAN   Plan of care Certification: 1/3/2024 to 03/03/2024     Outpatient Physical Therapy 3 times weekly for 12 weeks to include any combination of the following interventions: virtual visits, dry needling, modalities, electrical stimulation (IFC, Pre-Mod, Attended with Functional Dry Needling), Manual Therapy, Moist Heat/ Ice, Neuromuscular Re-ed, Patient Education, Self Care, Therapeutic Exercise, Functional Training, and Therapeutic Activites       Hector Moran, PT

## 2024-01-25 ENCOUNTER — CLINICAL SUPPORT (OUTPATIENT)
Dept: REHABILITATION | Facility: HOSPITAL | Age: 39
End: 2024-01-25
Payer: COMMERCIAL

## 2024-01-25 DIAGNOSIS — M25.662 DECREASED RANGE OF MOTION OF LEFT KNEE: Primary | ICD-10-CM

## 2024-01-25 DIAGNOSIS — R29.898 WEAKNESS OF LEFT LOWER EXTREMITY: ICD-10-CM

## 2024-01-25 PROCEDURE — 97140 MANUAL THERAPY 1/> REGIONS: CPT | Mod: PN

## 2024-01-25 PROCEDURE — 97112 NEUROMUSCULAR REEDUCATION: CPT | Mod: PN

## 2024-01-25 PROCEDURE — 97110 THERAPEUTIC EXERCISES: CPT | Mod: PN

## 2024-01-25 NOTE — PROGRESS NOTES
[facility]   Physical Therapy Treatment Note      Name: Tano Ponce  Clinic Number: [id number]    Therapy Diagnosis:   Encounter Diagnoses   Name Primary?    Decreased range of motion of left knee Yes    Weakness of left lower extremity      Physician: Najma Liao DO    Visit Date: 1/25/2024    Physician Orders: PT Eval and Treat  Medical Diagnosis from Referral: Rupture of left patellar tendon   Evaluation Date: 1/3/2024  Authorization Period Expiration: 12/27/2025  Plan of Care Expiration: 03/03/2024                          Progress Update: 02/02/2024            Visit # / Visits authorized: 1 / 1  :  8/20        FOTO:  Knee  Number Date Score    1 01/03/2024 66%   2       3       4          PRECAUTIONS: Standard Precautions and Safety/fall precautions         Time In: 0733  Time Out: 0835  Total Appointment Time (timed & untimed codes):62 minutes     12/27/2023  MD Referral note  Dx:  Left midsubstance patellar tendon tear, s/p repair 11/9/2023 by Dr. Carl  WBAT in brace  Brace was opened today to 30 degrees of flexion.  May increase 20 degrees per week as tolerated.   Needs to continue wearing brace all the time when not working with PT.    PTA Visit #: 0/5       Subjective     Patient reports: minimal pain.   He was compliant with home exercise program.  Response to previous treatment: Good  Functional change: Improved unassisted quad contraction    Pain: 1/10  Location: left knee      Objective      Objective Measures updated at progress report unless specified.   01/25/2024- left knee flexion- 120  Treatment     Tano received the treatments listed below:      Therapeutic exercises to develop strength, ROM, flexibility, posture, and core stabilization for 12 minutes including: ·     Activity   Details      Supine heel prop with 10#  x  5 minutes      Heel slides into knee flexion x        Calf stretch on wedge x        Heel raises x        Long sitting  hamstring stretch x              Manual therapy techniques: Joint mobilizations, Manual traction, Myofacial release, and Soft tissue Mobilization were applied to the: left knee for 8 minutes, including:      Activity   Details      STM           Patella mobs x                                              Neuromuscular re-education activities to improve: Balance, Coordination, Kinesthetic, Sense, Proprioception, Posture for 43 minutes. The following activities were included:     Activity   Details     Quad sets x       SLR  (supine) x  3x10 assist to control lag    Supine SAQ x 3x10 with assist to full extension and eccentric lowering     Side lying hip abduction x  3x10     Prone hip extension x  3x10     NMES with Quad set x 10 reps 10 second on 20 seconds off with 2 second ramp time     Seated LAQ 0-60  x  Assisted     Wall squat with ball x 30 degrees    Matrix hamstring curl x 30 3x10    Shuttle squats x 5 bands 2 minutes    Standing TKE with band  3x10         Therapeutic activities to improve functional performance for ---  minutes, including:    Activity   Details                                                                     Patient Education and Home Exercises       Education provided:   - Home program    Written Home Exercises Provided: Patient instructed to cont prior HEP. Exercises were reviewed and Tano was able to demonstrate them prior to the end of the session.  Tano demonstrated good  understanding of the education provided. See Electronic Medical Record under Patient Instructions for exercises provided during therapy sessions    Assessment     The patient presents without left knee immobilizer.  He was instructed in quad setting with improved individual performance.  His left quad function has improved and flexion has improved to 120 degrees    Tano Is progressing well towards his goals.   Patient prognosis is Good.     Patient will continue to benefit from skilled outpatient physical  therapy to address the deficits listed in the problem list box on initial evaluation, provide pt/family education and to maximize pt's level of independence in the home and community environment.     Patient's spiritual, cultural and educational needs considered and pt agreeable to plan of care and goals.     Anticipated barriers to physical therapy: None    SHORT TERM GOALS:  7 weeks Progress Date Met   Recent signs and systems trend is improving in order to progress towards Long term goals.  [] Met  [] Not Met  [] Progressing     Patient will be independent with Home Exercise Program  in order to further progress and return to maximal function. [] Met  [] Not Met  [] Progressing     Patient will improve left knee flexion to 115 degrees to improve function in gait and lifting       Patient will be able to correct postural deviations in sitting and standing, to decrease pain and promote postural awareness for injury prevention.  [] Met  [] Not Met  [] Progressing     Patient will improve functional outcome (FOTO) score: by 5% to increase self-worth & perceived functional ability towards long term goals [] Met  [] Not Met  [] Progressing        LONG TERM GOALS: 12 weeks Progress Date Met   Patient will return to normal activites of daily living, recreational, and work related activities with less pain and limitation.  [] Met  [] Not Met  [] Progressing     Patient will improve range of motion  to stated goals in order to return to maximal functional potential.  [] Met  [] Not Met  [] Progressing     Patient will improve Strength to stated goals of appropriate musculature in order to improve functional independence.  [] Met  [] Not Met  [] Progressing     Pain Rating at Best: 0/10 to improve Quality of Life.  [] Met  [] Not Met  [] Progressing     Patient will meet predicted functional outcome (FOTO) score: 80% to increase self-worth & perceived functional ability. [] Met  [] Not Met  [] Progressing     Patient will  have met/partially met personal goal of: improve pain and function in work and recreational activity [] Met  [] Not Met  [] Progressing              PLAN   Plan of care Certification: 1/3/2024 to 03/03/2024     Outpatient Physical Therapy 3 times weekly for 12 weeks to include any combination of the following interventions: virtual visits, dry needling, modalities, electrical stimulation (IFC, Pre-Mod, Attended with Functional Dry Needling), Manual Therapy, Moist Heat/ Ice, Neuromuscular Re-ed, Patient Education, Self Care, Therapeutic Exercise, Functional Training, and Therapeutic Activites       Hector Moran, PT

## 2024-01-30 ENCOUNTER — CLINICAL SUPPORT (OUTPATIENT)
Dept: REHABILITATION | Facility: HOSPITAL | Age: 39
End: 2024-01-30
Payer: COMMERCIAL

## 2024-01-30 DIAGNOSIS — R29.898 WEAKNESS OF LEFT LOWER EXTREMITY: ICD-10-CM

## 2024-01-30 DIAGNOSIS — M25.662 DECREASED RANGE OF MOTION OF LEFT KNEE: Primary | ICD-10-CM

## 2024-01-30 PROCEDURE — 97140 MANUAL THERAPY 1/> REGIONS: CPT | Mod: PN

## 2024-01-30 PROCEDURE — 97110 THERAPEUTIC EXERCISES: CPT | Mod: PN

## 2024-01-30 PROCEDURE — 97014 ELECTRIC STIMULATION THERAPY: CPT | Mod: PN

## 2024-01-30 PROCEDURE — 97112 NEUROMUSCULAR REEDUCATION: CPT | Mod: PN

## 2024-01-30 NOTE — PROGRESS NOTES
[facility]   Physical Therapy Treatment Note      Name: Tano Ponce  Clinic Number: [id number]    Therapy Diagnosis:   No diagnosis found.    Physician: Najma Liao DO    Visit Date: 1/30/2024    Physician Orders: PT Eval and Treat  Medical Diagnosis from Referral: Rupture of left patellar tendon   Evaluation Date: 1/3/2024  Authorization Period Expiration: 12/27/2025  Plan of Care Expiration: 03/03/2024                          Progress Update: 02/02/2024            Visit # / Visits authorized: 1 / 1  :  9/20        FOTO:  Knee  Number Date Score    1 01/03/2024 66%   2       3       4          PRECAUTIONS: Standard Precautions and Safety/fall precautions         Time In: 0730  Time Out: 0830  Total Appointment Time (timed & untimed codes):60 minutes     12/27/2023  MD Referral note  Dx:  Left midsubstance patellar tendon tear, s/p repair 11/9/2023 by Dr. Carl  WBAT in brace  Brace was opened today to 30 degrees of flexion.  May increase 20 degrees per week as tolerated.   Needs to continue wearing brace all the time when not working with PT.    PTA Visit #: 0/5       Subjective     Patient reports: minimal pain.   He was compliant with home exercise program.  Response to previous treatment: Good  Functional change: Improved unassisted quad contraction    Pain: 1/10  Location: left knee      Objective      Objective Measures updated at progress report unless specified.   01/25/2024- left knee flexion- 120  Treatment     Tano received the treatments listed below:      Therapeutic exercises to develop strength, ROM, flexibility, posture, and core stabilization for 12 minutes including: ·     Activity   Details      Supine heel prop with 10#  x  5 minutes      Heel slides into knee flexion x        Calf stretch on wedge x        Heel raises x        Long sitting hamstring stretch x              Manual therapy techniques: Joint mobilizations, Manual traction, Myofacial  release, and Soft tissue Mobilization were applied to the: left knee for 8 minutes, including:      Activity   Details      STM           Patella mobs x                                             Neuromuscular re-education activities to improve: Balance, Coordination, Kinesthetic, Sense, Proprioception, Posture for 40 minutes. The following activities were included:     Activity   Details     Quad sets x       SLR  (supine) x  3x10 assist to control lag    Supine SAQ x 3x10 with hold in extension     Side lying hip abduction x  3x10     Prone hip extension x  3x10     NMES with Quad set x 10 reps 10 second on 20 seconds off with 2 second ramp time     Seated LAQ  x  x40 reps     Wall squat with ball x 30 degrees    Matrix hamstring curl x 30 3x10    Shuttle squats x 5 bands 2 minutes    Standing TKE with band  3x10         Therapeutic activities to improve functional performance for ---  minutes, including:    Activity   Details                                                                     Patient Education and Home Exercises       Education provided:   - Home program    Written Home Exercises Provided: Patient instructed to cont prior HEP. Exercises were reviewed and Tano was able to demonstrate them prior to the end of the session.  Tano demonstrated good  understanding of the education provided. See Electronic Medical Record under Patient Instructions for exercises provided during therapy sessions    Assessment     The patient presents without left knee immobilizer.  He was instructed in quad setting with improved individual performance.  His left quad function has improved and flexion has improved to 120 degrees    Tano Is progressing well towards his goals.   Patient prognosis is Good.     Patient will continue to benefit from skilled outpatient physical therapy to address the deficits listed in the problem list box on initial evaluation, provide pt/family education and to maximize pt's level of  independence in the home and community environment.     Patient's spiritual, cultural and educational needs considered and pt agreeable to plan of care and goals.     Anticipated barriers to physical therapy: None    SHORT TERM GOALS:  7 weeks Progress Date Met   Recent signs and systems trend is improving in order to progress towards Long term goals.  [] Met  [] Not Met  [] Progressing     Patient will be independent with Home Exercise Program  in order to further progress and return to maximal function. [] Met  [] Not Met  [] Progressing     Patient will improve left knee flexion to 115 degrees to improve function in gait and lifting       Patient will be able to correct postural deviations in sitting and standing, to decrease pain and promote postural awareness for injury prevention.  [] Met  [] Not Met  [] Progressing     Patient will improve functional outcome (FOTO) score: by 5% to increase self-worth & perceived functional ability towards long term goals [] Met  [] Not Met  [] Progressing        LONG TERM GOALS: 12 weeks Progress Date Met   Patient will return to normal activites of daily living, recreational, and work related activities with less pain and limitation.  [] Met  [] Not Met  [] Progressing     Patient will improve range of motion  to stated goals in order to return to maximal functional potential.  [] Met  [] Not Met  [] Progressing     Patient will improve Strength to stated goals of appropriate musculature in order to improve functional independence.  [] Met  [] Not Met  [] Progressing     Pain Rating at Best: 0/10 to improve Quality of Life.  [] Met  [] Not Met  [] Progressing     Patient will meet predicted functional outcome (FOTO) score: 80% to increase self-worth & perceived functional ability. [] Met  [] Not Met  [] Progressing     Patient will have met/partially met personal goal of: improve pain and function in work and recreational activity [] Met  [] Not Met  [] Progressing               PLAN   Plan of care Certification: 1/3/2024 to 03/03/2024     Outpatient Physical Therapy 3 times weekly for 12 weeks to include any combination of the following interventions: virtual visits, dry needling, modalities, electrical stimulation (IFC, Pre-Mod, Attended with Functional Dry Needling), Manual Therapy, Moist Heat/ Ice, Neuromuscular Re-ed, Patient Education, Self Care, Therapeutic Exercise, Functional Training, and Therapeutic Activites       Hector Moran, PT

## 2024-02-01 ENCOUNTER — CLINICAL SUPPORT (OUTPATIENT)
Dept: REHABILITATION | Facility: HOSPITAL | Age: 39
End: 2024-02-01
Payer: COMMERCIAL

## 2024-02-01 DIAGNOSIS — M25.662 DECREASED RANGE OF MOTION OF LEFT KNEE: Primary | ICD-10-CM

## 2024-02-01 DIAGNOSIS — R29.898 WEAKNESS OF LEFT LOWER EXTREMITY: ICD-10-CM

## 2024-02-01 PROCEDURE — 97112 NEUROMUSCULAR REEDUCATION: CPT | Mod: PN

## 2024-02-01 PROCEDURE — 97140 MANUAL THERAPY 1/> REGIONS: CPT | Mod: PN

## 2024-02-01 PROCEDURE — 97014 ELECTRIC STIMULATION THERAPY: CPT | Mod: PN

## 2024-02-01 PROCEDURE — 97110 THERAPEUTIC EXERCISES: CPT | Mod: PN

## 2024-02-03 NOTE — PROGRESS NOTES
[facility]   Physical Therapy Treatment Note      Name: Tano Ponce  Clinic Number: [id number]    Therapy Diagnosis:   Encounter Diagnoses   Name Primary?    Decreased range of motion of left knee Yes    Weakness of left lower extremity        Physician: Najma Liao DO    Visit Date: 2/1/2024    Physician Orders: PT Eval and Treat  Medical Diagnosis from Referral: Rupture of left patellar tendon   Evaluation Date: 1/3/2024  Authorization Period Expiration: 12/27/2025  Plan of Care Expiration: 03/03/2024                          Progress Update: 02/02/2024            Visit # / Visits authorized: 1 / 1  : 10/20        FOTO:  Knee  Number Date Score    1 01/03/2024 66%   2       3       4          PRECAUTIONS: Standard Precautions and Safety/fall precautions         Time In: 0730  Time Out: 0845  Total Appointment Time (timed & untimed codes):60 minutes     12/27/2023  MD Referral note  Dx:  Left midsubstance patellar tendon tear, s/p repair 11/9/2023 by Dr. Carl  WBAT in brace  Brace was opened today to 30 degrees of flexion.  May increase 20 degrees per week as tolerated.   Needs to continue wearing brace all the time when not working with PT.    PTA Visit #: 0/5       Subjective     Patient reports: minimal pain.   He was compliant with home exercise program.  Response to previous treatment: Good  Functional change: Improved unassisted quad contraction    Pain: 1/10  Location: left knee      Objective      Objective Measures updated at progress report unless specified.   01/25/2024- left knee flexion- 120  Treatment     Tano received the treatments listed below:      Therapeutic exercises to develop strength, ROM, flexibility, posture, and core stabilization for 12 minutes including: ·     Activity   Details      Supine heel prop with 10#  x  5 minutes      Heel slides into knee flexion x        Calf stretch on wedge x        Heel raises x        Long sitting  hamstring stretch x              Manual therapy techniques: Joint mobilizations, Manual traction, Myofacial release, and Soft tissue Mobilization were applied to the: left knee for 8 minutes, including:      Activity   Details      STM           Patella mobs x                                             Neuromuscular re-education activities to improve: Balance, Coordination, Kinesthetic, Sense, Proprioception, Posture for 40 minutes. The following activities were included:     Activity   Details     Quad sets x       SLR  (supine) x  3x10 assist to control lag    Supine SAQ x 3x10 with hold in extension     Side lying hip abduction x  3x10     Prone hip extension x  3x10     NMES with Quad set x 10 reps 10 second on 20 seconds off with 2 second ramp time     Seated LAQ  x  x40 reps     Wall squat with ball x 30 degrees    Matrix hamstring curl x 30 3x10    Shuttle squats x 5 bands 2 minutes    Standing TKE with band  3x10         Therapeutic activities to improve functional performance for ---  minutes, including:    Activity   Details                                                                 Supervised modalities after being cleared for contradictions: IFC Electrical Stimulation:  Tano received IFC Electrical Stimulation for pain control applied to the left knee. Pt received stimulation for 15 minutes. Tano tolderated treatment well without any adverse effects.         Patient Education and Home Exercises       Education provided:   - Home program    Written Home Exercises Provided: Patient instructed to cont prior HEP. Exercises were reviewed and Tano was able to demonstrate them prior to the end of the session.  Tano demonstrated good  understanding of the education provided. See Electronic Medical Record under Patient Instructions for exercises provided during therapy sessions    Assessment     The patient presents with left knee immobilizer.  He has quad lag with SLR which is improving with  eccentrics, isometrics, and TKE's.  He is progressing with functional activity such as step ups and step downs.     Tano Is progressing well towards his goals.   Patient prognosis is Good.     Patient will continue to benefit from skilled outpatient physical therapy to address the deficits listed in the problem list box on initial evaluation, provide pt/family education and to maximize pt's level of independence in the home and community environment.     Patient's spiritual, cultural and educational needs considered and pt agreeable to plan of care and goals.     Anticipated barriers to physical therapy: None    SHORT TERM GOALS:  7 weeks Progress Date Met   Recent signs and systems trend is improving in order to progress towards Long term goals.  [] Met  [] Not Met  [] Progressing     Patient will be independent with Home Exercise Program  in order to further progress and return to maximal function. [] Met  [] Not Met  [] Progressing     Patient will improve left knee flexion to 115 degrees to improve function in gait and lifting       Patient will be able to correct postural deviations in sitting and standing, to decrease pain and promote postural awareness for injury prevention.  [] Met  [] Not Met  [] Progressing     Patient will improve functional outcome (FOTO) score: by 5% to increase self-worth & perceived functional ability towards long term goals [] Met  [] Not Met  [] Progressing        LONG TERM GOALS: 12 weeks Progress Date Met   Patient will return to normal activites of daily living, recreational, and work related activities with less pain and limitation.  [] Met  [] Not Met  [] Progressing     Patient will improve range of motion  to stated goals in order to return to maximal functional potential.  [] Met  [] Not Met  [] Progressing     Patient will improve Strength to stated goals of appropriate musculature in order to improve functional independence.  [] Met  [] Not Met  [] Progressing     Pain  Rating at Best: 0/10 to improve Quality of Life.  [] Met  [] Not Met  [] Progressing     Patient will meet predicted functional outcome (FOTO) score: 80% to increase self-worth & perceived functional ability. [] Met  [] Not Met  [] Progressing     Patient will have met/partially met personal goal of: improve pain and function in work and recreational activity [] Met  [] Not Met  [] Progressing              PLAN   Plan of care Certification: 1/3/2024 to 03/03/2024     Outpatient Physical Therapy 3 times weekly for 12 weeks to include any combination of the following interventions: virtual visits, dry needling, modalities, electrical stimulation (IFC, Pre-Mod, Attended with Functional Dry Needling), Manual Therapy, Moist Heat/ Ice, Neuromuscular Re-ed, Patient Education, Self Care, Therapeutic Exercise, Functional Training, and Therapeutic Activites       Hector Moran, PT

## 2024-02-05 ENCOUNTER — TELEPHONE (OUTPATIENT)
Dept: ORTHOPEDICS | Facility: CLINIC | Age: 39
End: 2024-02-05
Payer: COMMERCIAL

## 2024-02-05 ENCOUNTER — OFFICE VISIT (OUTPATIENT)
Dept: ORTHOPEDICS | Facility: CLINIC | Age: 39
End: 2024-02-05
Payer: COMMERCIAL

## 2024-02-05 VITALS
RESPIRATION RATE: 16 BRPM | SYSTOLIC BLOOD PRESSURE: 111 MMHG | WEIGHT: 246.94 LBS | DIASTOLIC BLOOD PRESSURE: 69 MMHG | HEIGHT: 69 IN | HEART RATE: 63 BPM | BODY MASS INDEX: 36.57 KG/M2

## 2024-02-05 DIAGNOSIS — S86.812D RUPTURE OF LEFT PATELLAR TENDON, SUBSEQUENT ENCOUNTER: Primary | ICD-10-CM

## 2024-02-05 PROCEDURE — 99999 PR PBB SHADOW E&M-EST. PATIENT-LVL III: CPT | Mod: PBBFAC,,, | Performed by: PHYSICIAN ASSISTANT

## 2024-02-05 PROCEDURE — 3078F DIAST BP <80 MM HG: CPT | Mod: CPTII,S$GLB,, | Performed by: PHYSICIAN ASSISTANT

## 2024-02-05 PROCEDURE — 1160F RVW MEDS BY RX/DR IN RCRD: CPT | Mod: CPTII,S$GLB,, | Performed by: PHYSICIAN ASSISTANT

## 2024-02-05 PROCEDURE — 3074F SYST BP LT 130 MM HG: CPT | Mod: CPTII,S$GLB,, | Performed by: PHYSICIAN ASSISTANT

## 2024-02-05 PROCEDURE — 1159F MED LIST DOCD IN RCRD: CPT | Mod: CPTII,S$GLB,, | Performed by: PHYSICIAN ASSISTANT

## 2024-02-05 PROCEDURE — 99024 POSTOP FOLLOW-UP VISIT: CPT | Mod: S$GLB,,, | Performed by: PHYSICIAN ASSISTANT

## 2024-02-05 NOTE — PROGRESS NOTES
"Subjective:      Patient ID: Tano Ponce is a 38 y.o. male.    Chief Complaint: Injury of the Left Knee      HPI: Tano Ponce is a 38-year-old male in clinic today for postoperative follow-up.  Patient is 3 months status post operative repair of left patellar tendon rupture.  Patient is doing very well this time.  He is weight-bearing fully without any pain or assistance.  He would like to go back to work at this time.    History reviewed. No pertinent past medical history.    Current Outpatient Medications:     ibuprofen (ADVIL,MOTRIN) 200 MG tablet, Take 200 mg by mouth every 6 (six) hours as needed for Pain., Disp: , Rfl:   Review of patient's allergies indicates:  No Known Allergies    /69   Pulse 63   Resp 16   Ht 5' 9" (1.753 m)   Wt 112 kg (246 lb 14.6 oz)   BMI 36.46 kg/m²     ROS      Objective:    Ortho Exam   Left knee:  Incision well healed, no signs of infection   Minimal edema   Minimal TTP  ROM: Extension 0°, flexion 90°   Calf and compartments are soft and compressible  Motor exam normal   Sensation and pulses intact  Cap refill brisk    GEN: Well developed, well nourished male. AAOX3. No acute distress.   Head: Normocephalic, atraumatic.   Eyes: ELDA  Neck: Trachea is midline, no adenopathy  Resp: Breathing unlabored.  Neuro: Motor function normal, Cranial nerves intact  Psych: Mood and affect appropriate.       Assessment:     Imaging:  No new imaging ordered today.        1. Rupture of left patellar tendon, subsequent encounter          Plan:       Encouraged the patient on the progress he is made so far.  He may return to work at this time, but I would like for him to wear the hinged knee brace while at work for at least the first couple of weeks.  After that he may wean out of it as tolerated.  Return to clinic in about 6 weeks for further evaluation.       Follow up in about 6 weeks (around 3/18/2024).          Patient note was created using MModal Dictation.  Any " errors in syntax or even information may not have been identified and edited on initial review prior to signing this note.

## 2024-02-05 NOTE — TELEPHONE ENCOUNTER
----- Message from Christina Michaels sent at 2/5/2024 12:22 PM CST -----  .Type:  Patient Returning Call    Who Called:.Tano Ponce   Who Left Message for Patient:ABDELRAHMAN  Does the patient know what this is regarding?: release form with no restrictions to return to work  Would the patient rather a call back or a response via [facility]? Call back  Best Call Back Number:.[phone]    Additional Information: He is outside in the lobby

## 2024-02-05 NOTE — TELEPHONE ENCOUNTER
----- Message from Samantha Washington sent at 2/5/2024 10:33 AM CST -----  Contact: Tano  .Type:  Patient Returning Call    Who Called: Tano   Who Left Message for Patient: Bev   Does the patient know what this is regarding?: unknown   Would the patient rather a call back or a response via [facility]: call   Best Call Back Number:[phone]   Additional Information:

## 2024-02-07 ENCOUNTER — PATIENT MESSAGE (OUTPATIENT)
Dept: REHABILITATION | Facility: HOSPITAL | Age: 39
End: 2024-02-07

## 2024-02-07 ENCOUNTER — CLINICAL SUPPORT (OUTPATIENT)
Dept: REHABILITATION | Facility: HOSPITAL | Age: 39
End: 2024-02-07
Payer: COMMERCIAL

## 2024-02-07 DIAGNOSIS — M25.662 DECREASED RANGE OF MOTION OF LEFT KNEE: Primary | ICD-10-CM

## 2024-02-07 DIAGNOSIS — R29.898 WEAKNESS OF LEFT LOWER EXTREMITY: ICD-10-CM

## 2024-02-07 PROCEDURE — 97110 THERAPEUTIC EXERCISES: CPT | Mod: PN

## 2024-02-07 PROCEDURE — 97014 ELECTRIC STIMULATION THERAPY: CPT | Mod: PN

## 2024-02-07 PROCEDURE — 97140 MANUAL THERAPY 1/> REGIONS: CPT | Mod: PN

## 2024-02-07 PROCEDURE — 97112 NEUROMUSCULAR REEDUCATION: CPT | Mod: PN

## 2024-02-07 NOTE — PROGRESS NOTES
[facility]   Physical Therapy Treatment Note      Name: Tano Ponce  Clinic Number: [id number]    Therapy Diagnosis:   Encounter Diagnoses   Name Primary?    Decreased range of motion of left knee Yes    Weakness of left lower extremity        Physician: Najma Liao DO    Visit Date: 2/7/2024    Physician Orders: PT Eval and Treat  Medical Diagnosis from Referral: Rupture of left patellar tendon   Evaluation Date: 1/3/2024  Authorization Period Expiration: 12/27/2025  Plan of Care Expiration: 03/03/2024                          Progress Update: 03/02/2024            Visit # / Visits authorized: 1 / 1  : 11/20        FOTO:  Knee  Number Date Score    1 01/03/2024 66%   2       3       4          PRECAUTIONS: Standard Precautions and Safety/fall precautions         Time In: 1200  Time Out: 1320  Total Appointment Time (timed & untimed codes):60 minutes     12/27/2023  MD Referral note  Dx:  Left midsubstance patellar tendon tear, s/p repair 11/9/2023 by Dr. Carl  WBAT in brace  Brace was opened today to 30 degrees of flexion.  May increase 20 degrees per week as tolerated.   Needs to continue wearing brace all the time when not working with PT.    PTA Visit #: 0/5       Subjective     Patient reports: States he was cleared to back to work tonight.  Return to work was stipulated that he wear a hinged knee sleeve.  He had increased pain at the lower patella to point of being sever   He was compliant with home exercise program.  Response to previous treatment: Good  Functional change: Improved unassisted quad contraction    Pain: 1/10  Location: left knee      Objective      Objective Measures updated at progress report unless specified.   01/25/2024- left knee flexion- 120  Treatment     Tano received the treatments listed below:      Therapeutic exercises to develop strength, ROM, flexibility, posture, and core stabilization for 12 minutes including: ·     Activity    Details      Supine heel prop with 10#  x  5 minutes      Heel slides into knee flexion x        Calf stretch on wedge x        Heel raises x        Long sitting hamstring stretch x              Manual therapy techniques: Joint mobilizations, Manual traction, Myofacial release, and Soft tissue Mobilization were applied to the: left knee for 8 minutes, including:      Activity   Details      STM           Patella mobs x                                             Neuromuscular re-education activities to improve: Balance, Coordination, Kinesthetic, Sense, Proprioception, Posture for 35 minutes. The following activities were included:     Activity   Details     Quad sets x       SLR  (supine) x  3x10 assist to control lag    Supine SAQ x 3x10 with hold in extension     Side lying hip abduction x  3x10     Prone hip extension x  3x10     NMES with Quad set  10 reps 10 second on 20 seconds off with 2 second ramp time     Seated LAQ  x  x50 reps     Wall squat with ball x 30 degrees    Matrix hamstring curl x 30 3x10    Shuttle squats x 5 bands 2 minutes    Standing TKE with band  3x10         Therapeutic activities to improve functional performance for ---  minutes, including:    Activity   Details                                                                 Supervised modalities after being cleared for contradictions: IFC Electrical Stimulation:  Tano received IFC Electrical Stimulation for pain control applied to the left knee. Pt received stimulation for 15 minutes. Tano tolderated treatment well without any adverse effects.         Patient Education and Home Exercises       Education provided:   - Home program    Written Home Exercises Provided: Patient instructed to cont prior HEP. Exercises were reviewed and Tano was able to demonstrate them prior to the end of the session.  Tano demonstrated good  understanding of the education provided. See Electronic Medical Record under Patient Instructions for  exercises provided during therapy sessions    Assessment     The patient has returned to work.  Knee brace reportedly increased pain.  He was instructed in and performed quad activity with improved SLR     Tano Is progressing well towards his goals.   Patient prognosis is Good.     Patient will continue to benefit from skilled outpatient physical therapy to address the deficits listed in the problem list box on initial evaluation, provide pt/family education and to maximize pt's level of independence in the home and community environment.     Patient's spiritual, cultural and educational needs considered and pt agreeable to plan of care and goals.     Anticipated barriers to physical therapy: None    SHORT TERM GOALS:  7 weeks Progress Date Met   Recent signs and systems trend is improving in order to progress towards Long term goals.  [x] Met  [] Not Met  [] Progressing     Patient will be independent with Home Exercise Program  in order to further progress and return to maximal function. [x] Met  [] Not Met  [] Progressing     Patient will improve left knee flexion to 115 degrees to improve function in gait and lifting  Met     Patient will be able to correct postural deviations in sitting and standing, to decrease pain and promote postural awareness for injury prevention.  [x] Met  [] Not Met  [] Progressing     Patient will improve functional outcome (FOTO) score: by 5% to increase self-worth & perceived functional ability towards long term goals [] Met  [] Not Met  [] Progressing        LONG TERM GOALS: 12 weeks Progress Date Met   Patient will return to normal activites of daily living, recreational, and work related activities with less pain and limitation.  [] Met  [] Not Met  [] Progressing     Patient will improve range of motion  to stated goals in order to return to maximal functional potential.  [] Met  [] Not Met  [] Progressing     Patient will improve Strength to stated goals of appropriate  musculature in order to improve functional independence.  [] Met  [] Not Met  [] Progressing     Pain Rating at Best: 0/10 to improve Quality of Life.  [] Met  [] Not Met  [] Progressing     Patient will meet predicted functional outcome (FOTO) score: 80% to increase self-worth & perceived functional ability. [] Met  [] Not Met  [] Progressing     Patient will have met/partially met personal goal of: improve pain and function in work and recreational activity [] Met  [] Not Met  [] Progressing              PLAN   Plan of care Certification: 1/3/2024 to 03/03/2024     Outpatient Physical Therapy 3 times weekly for 12 weeks to include any combination of the following interventions: virtual visits, dry needling, modalities, electrical stimulation (IFC, Pre-Mod, Attended with Functional Dry Needling), Manual Therapy, Moist Heat/ Ice, Neuromuscular Re-ed, Patient Education, Self Care, Therapeutic Exercise, Functional Training, and Therapeutic Activites       Hector Moran, PT

## 2024-03-18 ENCOUNTER — OFFICE VISIT (OUTPATIENT)
Dept: ORTHOPEDICS | Facility: CLINIC | Age: 39
End: 2024-03-18
Payer: COMMERCIAL

## 2024-03-18 VITALS — SYSTOLIC BLOOD PRESSURE: 115 MMHG | DIASTOLIC BLOOD PRESSURE: 72 MMHG | HEART RATE: 62 BPM

## 2024-03-18 DIAGNOSIS — S86.812D RUPTURE OF LEFT PATELLAR TENDON, SUBSEQUENT ENCOUNTER: Primary | ICD-10-CM

## 2024-03-18 PROCEDURE — 3074F SYST BP LT 130 MM HG: CPT | Mod: CPTII,S$GLB,, | Performed by: PHYSICIAN ASSISTANT

## 2024-03-18 PROCEDURE — 99999 PR PBB SHADOW E&M-EST. PATIENT-LVL III: CPT | Mod: PBBFAC,,, | Performed by: PHYSICIAN ASSISTANT

## 2024-03-18 PROCEDURE — 99214 OFFICE O/P EST MOD 30 MIN: CPT | Mod: S$GLB,,, | Performed by: PHYSICIAN ASSISTANT

## 2024-03-18 PROCEDURE — 1159F MED LIST DOCD IN RCRD: CPT | Mod: CPTII,S$GLB,, | Performed by: PHYSICIAN ASSISTANT

## 2024-03-18 PROCEDURE — 3078F DIAST BP <80 MM HG: CPT | Mod: CPTII,S$GLB,, | Performed by: PHYSICIAN ASSISTANT

## 2024-03-18 PROCEDURE — 1160F RVW MEDS BY RX/DR IN RCRD: CPT | Mod: CPTII,S$GLB,, | Performed by: PHYSICIAN ASSISTANT

## 2024-03-18 RX ORDER — NAPROXEN 500 MG/1
500 TABLET ORAL 2 TIMES DAILY
Qty: 60 TABLET | Refills: 2 | Status: SHIPPED | OUTPATIENT
Start: 2024-03-18 | End: 2024-06-16

## 2024-03-18 NOTE — PROGRESS NOTES
Subjective:      Patient ID: Tano Ponce is a 38 y.o. male.    Chief Complaint: Pain of the Left Knee      HPI: Tano Ponce is a 38-year-old male in clinic today for postoperative follow-up.  Patient is 4-1/2 months status post operative repair of left patellar tendon rupture.  Patient is doing very well this time.  He has returned to work without any restrictions.  He is weaned out of the hinged knee brace.  He complains that he does experience some swelling and discomfort when he is on his feet for prolonged periods of time.    History reviewed. No pertinent past medical history.    Current Outpatient Medications:     ibuprofen (ADVIL,MOTRIN) 200 MG tablet, Take 200 mg by mouth every 6 (six) hours as needed for Pain., Disp: , Rfl:     naproxen (NAPROSYN) 500 MG tablet, Take 1 tablet (500 mg total) by mouth 2 (two) times daily., Disp: 60 tablet, Rfl: 2  Review of patient's allergies indicates:  No Known Allergies    /72   Pulse 62     ROS      Objective:    Ortho Exam   Left knee:   Incision well healed, no signs of infection   Mild to moderate edema anteriorly   No joint effusion   Minimal TTP  ROM: Extension 0°, flexion 125°   Calf and compartments are soft and compressible   Motor exam normal   Sensation and pulses intact  Cap refill brisk    GEN: Well developed, well nourished male. AAOX3. No acute distress.   Head: Normocephalic, atraumatic.   Eyes: ELDA  Neck: Trachea is midline, no adenopathy  Resp: Breathing unlabored.  Neuro: Motor function normal, Cranial nerves intact  Psych: Mood and affect appropriate.       Assessment:     Imaging:  No new imaging ordered today.        1. Rupture of left patellar tendon, subsequent encounter          Plan:       Encouraged the patient on the progress he has made so far.  He should continue increasing all activities as tolerated.  Recommended ice, compression, and elevation to help with the swelling he is experiencing.  Explained that as he  continues to be more and more active and more time passes the swelling will continue to improve.  Prescription for naproxen was sent to the patient's pharmacy.  We will see him back in clinic in 6 weeks, if needed.    Orders Placed This Encounter    naproxen (NAPROSYN) 500 MG tablet     Follow up in about 6 weeks (around 4/29/2024).          Patient note was created using MModal Dictation.  Any errors in syntax or even information may not have been identified and edited on initial review prior to signing this note.

## 2024-12-29 ENCOUNTER — HOSPITAL ENCOUNTER (EMERGENCY)
Facility: HOSPITAL | Age: 39
Discharge: HOME | End: 2024-12-29
Attending: FAMILY MEDICINE
Payer: COMMERCIAL

## 2024-12-29 VITALS
HEART RATE: 105 BPM | RESPIRATION RATE: 16 BRPM | OXYGEN SATURATION: 95 % | HEIGHT: 69 IN | TEMPERATURE: 98 F | DIASTOLIC BLOOD PRESSURE: 65 MMHG | SYSTOLIC BLOOD PRESSURE: 141 MMHG | WEIGHT: 241.19 LBS | BODY MASS INDEX: 35.72 KG/M2

## 2024-12-29 DIAGNOSIS — J10.1 INFLUENZA A: Primary | ICD-10-CM

## 2024-12-29 LAB
INFLUENZA A, MOLECULAR: POSITIVE
INFLUENZA B, MOLECULAR: NEGATIVE
SARS-COV-2 RDRP RESP QL NAA+PROBE: NEGATIVE
SPECIMEN SOURCE: ABNORMAL

## 2024-12-29 PROCEDURE — 87502 INFLUENZA DNA AMP PROBE: CPT | Performed by: NURSE PRACTITIONER

## 2024-12-29 PROCEDURE — 87635 SARS-COV-2 COVID-19 AMP PRB: CPT | Performed by: NURSE PRACTITIONER

## 2024-12-29 PROCEDURE — 99283 EMERGENCY DEPT VISIT LOW MDM: CPT

## 2024-12-29 RX ORDER — OSELTAMIVIR PHOSPHATE 75 MG/1
75 CAPSULE ORAL 2 TIMES DAILY
Qty: 10 CAPSULE | Refills: 0 | Status: SHIPPED | OUTPATIENT
Start: 2024-12-29 | End: 2025-01-03

## 2024-12-29 NOTE — Clinical Note
"Tano "Tanotod Ponce was seen and treated in our emergency department on 12/29/2024.  He may return to work on 01/02/2025.       If you have any questions or concerns, please don't hesitate to call.      Wagner Story NP"

## 2024-12-29 NOTE — ED PROVIDER NOTES
Encounter Date: 12/29/2024       History     Chief Complaint   Patient presents with    General Illness     Pt c/o bodyaches, chills, cough, and headache.     Patient presents to ER for generalized body aches, onset yesterday.  Associated symptoms include chills, cough, headache.  Has tried nothing for the symptoms.  States his significant other recently diagnosed with influenza.  Symptoms have been intermittent since onset.  He denies fever, chest pain, shortness of breath, abdominal pain, nausea, vomiting, weakness, fatigue.    The history is provided by the patient.     Review of patient's allergies indicates:  No Known Allergies  No past medical history on file.  Past Surgical History:   Procedure Laterality Date    COLONOSCOPY      PATELLAR TENDON REPAIR Left 11/9/2023    Procedure: REPAIR, TENDON, PATELLAR;  Surgeon: Geovani Carl MD;  Location: [facility];  Service: Orthopedics;  Laterality: Left;     No family history on file.  Social History     Tobacco Use    Smoking status: Former     Types: Cigarettes, Vaping with nicotine    Smokeless tobacco: Current   Substance Use Topics    Alcohol use: Yes     Comment: occ     Review of Systems   Constitutional:  Positive for chills. Negative for fatigue and fever.   HENT:  Negative for congestion, ear pain, sinus pain and sore throat.    Eyes:  Negative for pain.   Respiratory:  Positive for cough. Negative for shortness of breath.    Cardiovascular:  Negative for chest pain.   Gastrointestinal:  Negative for abdominal pain, diarrhea, nausea and vomiting.   Genitourinary:  Negative for dysuria.   Musculoskeletal:  Negative for back pain and neck pain.        +generalized body aches   Skin:  Negative for rash.   Neurological:  Positive for headaches. Negative for weakness.       Physical Exam     Initial Vitals [12/29/24 1034]   BP Pulse Resp Temp SpO2   (!) 141/65 105 16 98.4 °F (36.9 °C) 95 %      MAP       --         Physical Exam    Nursing note and vitals  reviewed.  Constitutional: He is not diaphoretic. He is cooperative.  Non-toxic appearance. He does not have a sickly appearance. He does not appear ill. No distress.   HENT:   Head: Normocephalic and atraumatic.   Right Ear: Tympanic membrane, external ear and ear canal normal.   Left Ear: Tympanic membrane, external ear and ear canal normal.   Nose: Nose normal. Mouth/Throat: Uvula is midline, oropharynx is clear and moist and mucous membranes are normal. No uvula swelling. No oropharyngeal exudate, posterior oropharyngeal edema or posterior oropharyngeal erythema.   Eyes: Conjunctivae are normal.   Neck: Neck supple.   Normal range of motion.  Cardiovascular:  Regular rhythm.           Mild tachycardia 105 bpm   Pulmonary/Chest: Breath sounds normal. No respiratory distress. He has no wheezes. He has no rhonchi. He has no rales.   Abdominal: Abdomen is soft. There is no abdominal tenderness.   Musculoskeletal:         General: Normal range of motion.      Cervical back: Normal range of motion and neck supple.     Neurological: He is alert and oriented to person, place, and time. He has normal strength. GCS score is 15. GCS eye subscore is 4. GCS verbal subscore is 5. GCS motor subscore is 6.   Skin: Skin is warm and dry. Capillary refill takes less than 2 seconds.         ED Course   Procedures  Labs Reviewed   INFLUENZA A & B BY MOLECULAR - Abnormal       Result Value    Influenza A, Molecular Positive (*)     Influenza B, Molecular Negative      Flu A & B Source Nasal swab     SARS-COV-2 RNA AMPLIFICATION, QUAL    SARS-CoV-2 RNA, Amplification, Qual Negative            Imaging Results    None          Medications - No data to display  Medical Decision Making  Risk  Prescription drug management.                   Results reviewed and discussed with patient and he verbalized understanding with no further concerns.  He is nontoxic/non ill-appearing.  Breath sounds clear to auscultation bilaterally.  Respirations  even and nonlabored.  He is in no acute distress.  Discussed symptomatic care home.  Discussed use of over-the-counter Tylenol/ibuprofen for pain/fever.  Tamiflu Rx provided.  Discussed outpatient follow-up with PCP.  Discussed signs symptoms to return to ER.  Patient agrees with plan and voiced no further concerns.    Regarding INFLUENZA, for prevention, I advised patient to: clean hands with soap and water or an alcohol-based hand rub frequently;  cover mouth and nose with bend of elbow when coughing or sneezing; limit face-to-face contact with others;  maintain good ventilation in the home. For treatment, recommended that patient: get annual vaccination; get plenty of rest; drink clear fluids like water, broth, sports drinks, or electrolyte beverages; place cool, damp washcloth on forehead, arms, and legs to reduce discomfort associated with a fever; use a humidifier; gargle with warm salt water; and take over-the-counter acetaminophen or ibuprofen for pain relief and fever control. I also discussed the viral origin of influenza and treatment limitations.     I discussed with patient  that evaluation in the ED does not suggest any emergent or life threatening medical conditions requiring immediate intervention beyond what was provided in the ED, and I believe patient is safe for discharge. Regardless, an unremarkable evaluation in the ED does not preclude the development or presence of a serious of life threatening condition. As such, patient was instructed to return immediately for any worsening or change in current symptoms.                     Clinical Impression:  Final diagnoses:  [J10.1] Influenza A (Primary)          ED Disposition Condition    Discharge Stable          ED Prescriptions       Medication Sig Dispense Start Date End Date Auth. Provider    oseltamivir (TAMIFLU) 75 MG capsule Take 1 capsule (75 mg total) by mouth 2 (two) times daily. for 5 days 10 capsule 12/29/2024 1/3/2025 Wagner Story,  NP          Follow-up Information       Follow up With Specialties Details Why Contact Info    [place]  In 2 days  [address] [facility] [id number] [phone]      [facility] Emergency Dept. Emergency Medicine  As needed, If symptoms worsen [id number] [facility] [id number] [phone]             Wagner Story, NP  12/29/24 [id number]

## (undated) DEVICE — MANIFOLD 4 PORT

## (undated) DEVICE — GLOVE SURG BIOGEL LATEX SZ 7.5

## (undated) DEVICE — ELECTRODE REM PLYHSV RETURN 9

## (undated) DEVICE — GLOVE BIOGEL PI ORTHO PRO SZ7

## (undated) DEVICE — SYS SWIVELOCK ANCH 4.75X19.1MM
Type: IMPLANTABLE DEVICE | Site: KNEE | Status: NON-FUNCTIONAL
Removed: 2023-11-09

## (undated) DEVICE — SUT 2-0 27IN VICRYL PLUS CT

## (undated) DEVICE — ALCOHOL 70% ANTISEPTIC ISO 4OZ

## (undated) DEVICE — STAPLER SKIN PROXIMATE WIDE

## (undated) DEVICE — DRAPE T EXTRM SURG 121X128X90

## (undated) DEVICE — PACK BASIC SETUP SC BR

## (undated) DEVICE — DRAPE U SPLIT SHEET 54X76IN

## (undated) DEVICE — DRESSING XEROFORM NONADH 1X8IN

## (undated) DEVICE — SUT X425H ETHIBOND 1-0

## (undated) DEVICE — SPONGE COTTON TRAY 4X4IN

## (undated) DEVICE — DRESSING AQUACEL AG ADV 3.5X6

## (undated) DEVICE — COVER LIGHT HANDLE 80/CA

## (undated) DEVICE — TOWEL OR DISP STRL BLUE 4/PK

## (undated) DEVICE — TUBING MEDI-VAC 20FT .25IN

## (undated) DEVICE — DRAPE IOBAN 2 STERI

## (undated) DEVICE — GOWN POLY REINF BRTH SLV XL

## (undated) DEVICE — APPLICATOR CHLORAPREP ORN 26ML